# Patient Record
Sex: FEMALE | Race: WHITE | NOT HISPANIC OR LATINO
[De-identification: names, ages, dates, MRNs, and addresses within clinical notes are randomized per-mention and may not be internally consistent; named-entity substitution may affect disease eponyms.]

---

## 2022-02-21 ENCOUNTER — TRANSCRIPTION ENCOUNTER (OUTPATIENT)
Age: 67
End: 2022-02-21

## 2022-12-19 ENCOUNTER — OFFICE (OUTPATIENT)
Dept: URBAN - METROPOLITAN AREA CLINIC 113 | Facility: CLINIC | Age: 67
Setting detail: OPHTHALMOLOGY
End: 2022-12-19
Payer: COMMERCIAL

## 2022-12-19 VITALS — HEIGHT: 55 IN

## 2022-12-19 DIAGNOSIS — H43.813: ICD-10-CM

## 2022-12-19 DIAGNOSIS — H26.493: ICD-10-CM

## 2022-12-19 DIAGNOSIS — E11.9: ICD-10-CM

## 2022-12-19 DIAGNOSIS — Z96.1: ICD-10-CM

## 2022-12-19 DIAGNOSIS — H35.373: ICD-10-CM

## 2022-12-19 PROCEDURE — 92014 COMPRE OPH EXAM EST PT 1/>: CPT | Performed by: OPTOMETRIST

## 2022-12-19 PROCEDURE — 92134 CPTRZ OPH DX IMG PST SGM RTA: CPT | Performed by: OPTOMETRIST

## 2022-12-19 ASSESSMENT — KERATOMETRY
OS_AXISANGLE_DEGREES: 078
OS_K1POWER_DIOPTERS: 46.25
OD_AXISANGLE_DEGREES: 091
OD_K2POWER_DIOPTERS: 48.25
METHOD_AUTO_MANUAL: AUTO
OD_K1POWER_DIOPTERS: 46.25
OS_K2POWER_DIOPTERS: 49.25

## 2022-12-19 ASSESSMENT — REFRACTION_MANIFEST
OD_SPHERE: -0.25
OD_AXIS: 025
OS_SPHERE: PLANO
OS_VA1: 20/20
OD_VA1: 20/20
OS_AXIS: 120
OD_CYLINDER: -1.00
OS_CYLINDER: -0.50

## 2022-12-19 ASSESSMENT — SPHEQUIV_DERIVED
OD_SPHEQUIV: -0.75
OS_SPHEQUIV: -1
OD_SPHEQUIV: -0.875

## 2022-12-19 ASSESSMENT — REFRACTION_AUTOREFRACTION
OD_AXIS: 034
OS_CYLINDER: -0.50
OD_SPHERE: -0.50
OS_AXIS: 098
OD_CYLINDER: -0.75
OS_SPHERE: -0.75

## 2022-12-19 ASSESSMENT — AXIALLENGTH_DERIVED
OD_AL: 22.5544
OD_AL: 22.599
OS_AL: 22.4759

## 2022-12-19 ASSESSMENT — TONOMETRY
OD_IOP_MMHG: 13
OS_IOP_MMHG: 15

## 2022-12-19 ASSESSMENT — VISUAL ACUITY
OD_BCVA: 20/20-
OS_BCVA: 20/20-

## 2022-12-19 ASSESSMENT — CONFRONTATIONAL VISUAL FIELD TEST (CVF)
OD_FINDINGS: FULL
OS_FINDINGS: FULL

## 2022-12-19 ASSESSMENT — LID POSITION - PTOSIS: OS_PTOSIS: LUL 1+

## 2023-03-20 ENCOUNTER — NON-APPOINTMENT (OUTPATIENT)
Age: 68
End: 2023-03-20

## 2023-03-20 ENCOUNTER — APPOINTMENT (OUTPATIENT)
Dept: GASTROENTEROLOGY | Facility: CLINIC | Age: 68
End: 2023-03-20
Payer: MEDICARE

## 2023-03-20 VITALS
OXYGEN SATURATION: 98 % | WEIGHT: 131 LBS | HEART RATE: 70 BPM | RESPIRATION RATE: 16 BRPM | DIASTOLIC BLOOD PRESSURE: 65 MMHG | BODY MASS INDEX: 24.73 KG/M2 | HEIGHT: 61 IN | SYSTOLIC BLOOD PRESSURE: 110 MMHG

## 2023-03-20 DIAGNOSIS — Z78.9 OTHER SPECIFIED HEALTH STATUS: ICD-10-CM

## 2023-03-20 PROCEDURE — 99203 OFFICE O/P NEW LOW 30 MIN: CPT

## 2023-03-20 RX ORDER — ROSUVASTATIN CALCIUM 20 MG/1
20 TABLET, FILM COATED ORAL
Refills: 0 | Status: ACTIVE | COMMUNITY

## 2023-03-20 RX ORDER — PENTOXIFYLLINE 400 MG/1
TABLET, EXTENDED RELEASE ORAL
Refills: 0 | Status: ACTIVE | COMMUNITY

## 2023-03-20 NOTE — PHYSICAL EXAM

## 2023-03-20 NOTE — ASSESSMENT
[FreeTextEntry1] : Patient is a 67 year female, with PMH HLD, hypothyroidism, NonHodgkins Lymphoma in 1984 s/p radiation, lung cancer in 2015 s/p surgical resection, breast cancer in 2020 s/p lumpectomy, who presents for colon cancer screening. \par \par Colonoscopy to be scheduled. I have discussed the indications, risks and benefits of procedure with patient. Risks include, but not limited to, bleeding, perforation, infection, and reaction to anesthesia. Alternatives to colonoscopy discussed with patient. Patient was given the opportunity to ask questions, all questions were answered. The patient agrees to proceed with colonoscopy. Patient is medically optimized for colonoscopy. \par \par Miralax/dulcolax prep to be used. Bowel prep instructions discussed at length. \par \par CBC/CMP, PT/INR ordered prior to procedure\par \par I evaluated this patient with my ACP Miladys and agree with the above assessment and management plan for repeat low risk screening colonoscopy in a patient who is status post negative colonoscopy roughly 10 years ago.  Her ASA classification is 2 and she is medically optimized for the proposed colonoscopy and appeared to understand all of the above instructions, information, and management plan.

## 2023-03-20 NOTE — ADDENDUM
[FreeTextEntry1] : I, Miladys Hunter PA-C, acted as a scribe for the services dictated to me by CYNTHIA Miles in this document on Mar 20, 2023 for FLORY JENKINS .\par

## 2023-05-31 DIAGNOSIS — L59.8 OTHER SPECIFIED DISORDERS OF THE SKIN AND SUBCUTANEOUS TISSUE RELATED TO RADIATION: ICD-10-CM
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
## 2023-06-04 ENCOUNTER — TRANSCRIPTION ENCOUNTER (OUTPATIENT)
Age: 68
End: 2023-06-04

## 2023-06-05 ENCOUNTER — APPOINTMENT (OUTPATIENT)
Dept: GASTROENTEROLOGY | Facility: GI CENTER | Age: 68
End: 2023-06-05
Payer: MEDICARE

## 2023-06-05 ENCOUNTER — OUTPATIENT (OUTPATIENT)
Dept: OUTPATIENT SERVICES | Facility: HOSPITAL | Age: 68
LOS: 1 days | End: 2023-06-05
Payer: MEDICARE

## 2023-06-05 ENCOUNTER — TRANSCRIPTION ENCOUNTER (OUTPATIENT)
Age: 68
End: 2023-06-05

## 2023-06-05 DIAGNOSIS — Z12.11 ENCOUNTER FOR SCREENING FOR MALIGNANT NEOPLASM OF COLON: ICD-10-CM

## 2023-06-05 DIAGNOSIS — K64.8 OTHER HEMORRHOIDS: ICD-10-CM

## 2023-06-05 PROCEDURE — G0121: CPT

## 2023-06-05 PROCEDURE — 45378 DIAGNOSTIC COLONOSCOPY: CPT | Mod: PT

## 2023-06-05 NOTE — REASON FOR VISIT
[Procedure: _________] : a [unfilled] procedure visit [FreeTextEntry1] : Pt. presents for screening colonoscopy.

## 2023-06-05 NOTE — PHYSICAL EXAM
[Alert] : alert [Normal Voice/Communication] : normal voice/communication [Healthy Appearing] : healthy appearing [No Acute Distress] : no acute distress [Well Developed] : well developed [Well Nourished] : well nourished [Sclera] : the sclera and conjunctiva were normal [Hearing Threshold Finger Rub Not [place]] : hearing was normal [Normal Lips/Gums] : the lips and gums were normal [Oropharynx] : the oropharynx was normal [Normal Appearance] : the appearance of the neck was normal [No Neck Mass] : no neck mass was observed [No Respiratory Distress] : no respiratory distress [No Acc Muscle Use] : no accessory muscle use [Respiration, Rhythm And Depth] : normal respiratory rhythm and effort [Auscultation Breath Sounds / Voice Sounds] : lungs were clear to auscultation bilaterally [Heart Rate And Rhythm] : heart rate was normal and rhythm regular [Normal S1, S2] : normal S1 and S2 [Murmurs] : no murmurs [None] : no edema [Bowel Sounds] : normal bowel sounds [Abdomen Tenderness] : non-tender [No Masses] : no abdominal mass palpated [Abdomen Soft] : soft [] : no hepatosplenomegaly [Oriented To Time, Place, And Person] : oriented to person, place, and time

## 2023-09-21 ENCOUNTER — NON-APPOINTMENT (OUTPATIENT)
Age: 68
End: 2023-09-21

## 2023-09-24 ENCOUNTER — NON-APPOINTMENT (OUTPATIENT)
Age: 68
End: 2023-09-24

## 2023-09-30 ENCOUNTER — EMERGENCY (EMERGENCY)
Facility: HOSPITAL | Age: 68
LOS: 1 days | Discharge: HOME | End: 2023-09-30
Attending: EMERGENCY MEDICINE
Payer: MEDICARE

## 2023-09-30 VITALS
SYSTOLIC BLOOD PRESSURE: 107 MMHG | HEART RATE: 89 BPM | TEMPERATURE: 98 F | OXYGEN SATURATION: 97 % | DIASTOLIC BLOOD PRESSURE: 69 MMHG | RESPIRATION RATE: 20 BRPM | WEIGHT: 136.69 LBS

## 2023-09-30 VITALS
HEART RATE: 91 BPM | SYSTOLIC BLOOD PRESSURE: 134 MMHG | OXYGEN SATURATION: 95 % | DIASTOLIC BLOOD PRESSURE: 80 MMHG | TEMPERATURE: 98 F | RESPIRATION RATE: 17 BRPM

## 2023-09-30 LAB
ALBUMIN SERPL ELPH-MCNC: 3.1 G/DL — LOW (ref 3.3–5.2)
ALP SERPL-CCNC: 859 U/L — HIGH (ref 40–120)
ALT FLD-CCNC: 528 U/L — HIGH
ANION GAP SERPL CALC-SCNC: 12 MMOL/L — SIGNIFICANT CHANGE UP (ref 5–17)
APTT BLD: 34.8 SEC — SIGNIFICANT CHANGE UP (ref 24.5–35.6)
AST SERPL-CCNC: 310 U/L — HIGH
BILIRUB SERPL-MCNC: 0.4 MG/DL — SIGNIFICANT CHANGE UP (ref 0.4–2)
BUN SERPL-MCNC: 22.2 MG/DL — HIGH (ref 8–20)
CALCIUM SERPL-MCNC: 7.9 MG/DL — LOW (ref 8.4–10.5)
CHLORIDE SERPL-SCNC: 94 MMOL/L — LOW (ref 96–108)
CO2 SERPL-SCNC: 23 MMOL/L — SIGNIFICANT CHANGE UP (ref 22–29)
CREAT SERPL-MCNC: 0.82 MG/DL — SIGNIFICANT CHANGE UP (ref 0.5–1.3)
EGFR: 77 ML/MIN/1.73M2 — SIGNIFICANT CHANGE UP
GLUCOSE SERPL-MCNC: 108 MG/DL — HIGH (ref 70–99)
HCT VFR BLD CALC: 33.2 % — LOW (ref 34.5–45)
HGB BLD-MCNC: 11.3 G/DL — LOW (ref 11.5–15.5)
INR BLD: 1.11 RATIO — SIGNIFICANT CHANGE UP (ref 0.85–1.18)
LIDOCAIN IGE QN: 22 U/L — SIGNIFICANT CHANGE UP (ref 22–51)
MAGNESIUM SERPL-MCNC: 1.8 MG/DL — SIGNIFICANT CHANGE UP (ref 1.6–2.6)
MCHC RBC-ENTMCNC: 28.8 PG — SIGNIFICANT CHANGE UP (ref 27–34)
MCHC RBC-ENTMCNC: 34 GM/DL — SIGNIFICANT CHANGE UP (ref 32–36)
MCV RBC AUTO: 84.7 FL — SIGNIFICANT CHANGE UP (ref 80–100)
NT-PROBNP SERPL-SCNC: 1176 PG/ML — HIGH (ref 0–300)
PLATELET # BLD AUTO: 151 K/UL — SIGNIFICANT CHANGE UP (ref 150–400)
POTASSIUM SERPL-MCNC: 4.4 MMOL/L — SIGNIFICANT CHANGE UP (ref 3.5–5.3)
POTASSIUM SERPL-SCNC: 4.4 MMOL/L — SIGNIFICANT CHANGE UP (ref 3.5–5.3)
PROT SERPL-MCNC: 8.1 G/DL — SIGNIFICANT CHANGE UP (ref 6.6–8.7)
PROTHROM AB SERPL-ACNC: 12.3 SEC — SIGNIFICANT CHANGE UP (ref 9.5–13)
RBC # BLD: 3.92 M/UL — SIGNIFICANT CHANGE UP (ref 3.8–5.2)
RBC # FLD: 14.6 % — HIGH (ref 10.3–14.5)
SODIUM SERPL-SCNC: 129 MMOL/L — LOW (ref 135–145)
TROPONIN T SERPL-MCNC: <0.01 NG/ML — SIGNIFICANT CHANGE UP (ref 0–0.06)
WBC # BLD: 8.69 K/UL — SIGNIFICANT CHANGE UP (ref 3.8–10.5)
WBC # FLD AUTO: 8.69 K/UL — SIGNIFICANT CHANGE UP (ref 3.8–10.5)

## 2023-09-30 PROCEDURE — 83690 ASSAY OF LIPASE: CPT

## 2023-09-30 PROCEDURE — G1004: CPT

## 2023-09-30 PROCEDURE — 85730 THROMBOPLASTIN TIME PARTIAL: CPT

## 2023-09-30 PROCEDURE — 80053 COMPREHEN METABOLIC PANEL: CPT

## 2023-09-30 PROCEDURE — 71275 CT ANGIOGRAPHY CHEST: CPT | Mod: MA

## 2023-09-30 PROCEDURE — 80074 ACUTE HEPATITIS PANEL: CPT

## 2023-09-30 PROCEDURE — 83735 ASSAY OF MAGNESIUM: CPT

## 2023-09-30 PROCEDURE — 83880 ASSAY OF NATRIURETIC PEPTIDE: CPT

## 2023-09-30 PROCEDURE — 76705 ECHO EXAM OF ABDOMEN: CPT

## 2023-09-30 PROCEDURE — 80307 DRUG TEST PRSMV CHEM ANLYZR: CPT

## 2023-09-30 PROCEDURE — 74176 CT ABD & PELVIS W/O CONTRAST: CPT | Mod: ME

## 2023-09-30 PROCEDURE — 74176 CT ABD & PELVIS W/O CONTRAST: CPT | Mod: 26,ME

## 2023-09-30 PROCEDURE — 85025 COMPLETE CBC W/AUTO DIFF WBC: CPT

## 2023-09-30 PROCEDURE — 85610 PROTHROMBIN TIME: CPT

## 2023-09-30 PROCEDURE — 93010 ELECTROCARDIOGRAM REPORT: CPT

## 2023-09-30 PROCEDURE — 71275 CT ANGIOGRAPHY CHEST: CPT | Mod: 26,MA

## 2023-09-30 PROCEDURE — 71046 X-RAY EXAM CHEST 2 VIEWS: CPT

## 2023-09-30 PROCEDURE — 84484 ASSAY OF TROPONIN QUANT: CPT

## 2023-09-30 PROCEDURE — 93005 ELECTROCARDIOGRAM TRACING: CPT

## 2023-09-30 PROCEDURE — 76705 ECHO EXAM OF ABDOMEN: CPT | Mod: 26

## 2023-09-30 PROCEDURE — 99285 EMERGENCY DEPT VISIT HI MDM: CPT

## 2023-09-30 PROCEDURE — 36415 COLL VENOUS BLD VENIPUNCTURE: CPT

## 2023-09-30 PROCEDURE — 71046 X-RAY EXAM CHEST 2 VIEWS: CPT | Mod: 26

## 2023-09-30 PROCEDURE — 99285 EMERGENCY DEPT VISIT HI MDM: CPT | Mod: 25

## 2023-09-30 RX ORDER — ACETAMINOPHEN WITH CODEINE 300MG-30MG
1 TABLET ORAL ONCE
Refills: 0 | Status: DISCONTINUED | OUTPATIENT
Start: 2023-09-30 | End: 2023-09-30

## 2023-09-30 RX ADMIN — Medication 1 TABLET(S): at 18:58

## 2023-09-30 NOTE — ED PROVIDER NOTE - OBJECTIVE STATEMENT
68yo female with pmh of non hodgkins lymphoma, hypothyroidism, breast cancer presents with sob, chest pain and back pain. Pt states she started to have lower back pain a few days ago and then noticed a rash. Pt with 10 days of cough and the sob, oneil and intermittent chest pain. Pt  unsure of alleviating or exacerbating factors to chest pain. PT with chills and subjective fevers. Pt denies ha, loc, focal neuro deficits, palp, abd pain/n/v/d, urinary symptoms, recent travel

## 2023-09-30 NOTE — ED PROVIDER NOTE - PATIENT PORTAL LINK FT
You can access the FollowMyHealth Patient Portal offered by [facility] by registering at the following website: [email or web address]. By joining [facility]’s FollowMyHealth portal, you will also be able to view your health information using other applications (apps) compatible with our system.

## 2023-09-30 NOTE — ED PROVIDER NOTE - PHYSICAL EXAMINATION
Const: Awake, alert and oriented. In no acute distress. Well appearing.  HEENT: NC/AT. Moist mucous membranes.  Eyes: No scleral icterus. EOMI.  Neck:. Soft and supple. Full ROM without pain.  Cardiac: Regular rate and regular rhythm. +S1/S2. Peripheral pulses 2+ and symmetric. No LE edema.  Resp: Speaking in full sentences. No evidence of respiratory distress. No wheezes, rales or rhonchi.  Abd: Soft, non-tender, non-distended. Normal bowel sounds in all 4 quadrants. No guarding or rebound.  Back: Spine midline and non-tender. No CVAT.  Skin: right lower back with blanching erythematous rash starting to have a vesicle dermatomal like distribution   Lymph: No cervical lymphadenopathy.  Neuro: Awake, alert & oriented x 3. Moves all extremities symmetrically.

## 2023-09-30 NOTE — ED ADULT NURSE REASSESSMENT NOTE - NS ED NURSE REASSESS COMMENT FT1
Pt had bloodwork drawn for testing, pt udpated on the plan of care by myself and MD Hernandez, pt states understanding of update to plan of care, pt has no requests at this time, pt is resting comfortably reading a book. VS noted in the chart. Education proficiency determined successful by teach back.

## 2023-09-30 NOTE — ED ADULT NURSE REASSESSMENT NOTE - NS ED NURSE REASSESS COMMENT FT1
Assuming care from TERRY Herrera, review of patients history, reason for ED visit, medication administered, tests performed/to be performed, introduced to pt at bedside, updated patient as to the plan of care, pt education deemed successful after teach back shows proficiency. Pt to be taken to CT scan at this time.

## 2023-09-30 NOTE — ED PROVIDER NOTE - CLINICAL SUMMARY MEDICAL DECISION MAKING FREE TEXT BOX
68yo female with pmh of non hodgkins lymphoma, hypothyroidism, breast cancer presents with sob, chest pain and back pain. Found to have zoster like rash, pt with BP similar, pulses equal bilaterally, well appearing due to history high risk PE will do labs, CTA, pt with no acute ischemic changes on ekg, will do trop

## 2023-09-30 NOTE — ED ADULT NURSE NOTE - OBJECTIVE STATEMENT
A&OX4, Presents to ed with complaints of lower back pain, cough and intermittent chest pain, Patient reports having rsv ten days ago. A&OX4, Presents to ed with complaints of lower back pain, cough and intermittent chest pain, Patient reports having rsv ten days ago. Reports hx of hypothyroidism, non hodgkins lymphoma.

## 2023-09-30 NOTE — ED PROVIDER NOTE - NSFOLLOWUPINSTRUCTIONS_ED_ALL_ED_FT
You are advised to please follow up with your primary care doctor within the next 24 hours and return to the Emergency Department for worsening symptoms or any other concerns.  Your doctor may call [phone] to follow up on the specific results of the tests performed today in the emergency department.    YOUR CT SHOWS DIFFUSE LYMPHADENOPATHY CONCERNING FOR RECURRENCE OF LYMPHOMA OR METASTATIC DISEASE. YOU MUST FOLLOW UP WITH YOUR ONCOLOGIST AS SOON AS POSSIBLE. YOUR LIVER ENZYMES ARE ALSO ELEVATED. PLEASE FOLLOW UP WITH YOUR DOCTOR.    Lymphadenopathy    Lymphadenopathy means that your lymph glands are swollen or larger than normal. Lymph glands, also called lymph nodes, are collections of tissue that filter excess fluid, bacteria, viruses, and waste from your bloodstream. They are part of your body's disease-fighting system (immune system), which protects your body from germs.    There may be different causes of lymphadenopathy, depending on where it is in your body. Some types go away on their own. Lymphadenopathy can occur anywhere that you have lymph glands, including these areas:  Neck (cervical lymphadenopathy).  Chest (mediastinal lymphadenopathy).  Lungs (hilar lymphadenopathy).  Underarms (axillary lymphadenopathy).  Groin (inguinal lymphadenopathy).  When your immune system responds to germs, infection-fighting cells and fluid build up in your lymph glands. This causes some swelling and enlargement. If the lymph nodes do not go back to normal size after you have an infection or disease, your health care provider may do tests. These tests help to monitor your condition and find the reason why the glands are still swollen and enlarged.    Follow these instructions at home:    Get plenty of rest.  Your health care provider may recommend over-the-counter medicines for pain. Take over-the-counter and prescription medicines only as told by your health care provider.  If directed, apply heat to swollen lymph glands as often as told by your health care provider. Use the heat source that your health care provider recommends, such as a moist heat pack or a heating pad.  Place a towel between your skin and the heat source.  Leave the heat on for 20–30 minutes.  Remove the heat if your skin turns bright red. This is especially important if you are unable to feel pain, heat, or cold. You may have a greater risk of getting burned.  Check your affected lymph glands every day for changes. Check other lymph gland areas as told by your health care provider. Check for changes such as:  More swelling.  Sudden increase in size.  Redness or pain.  Hardness.  Keep all follow-up visits. This is important.  Contact a health care provider if you have:  Lymph glands that:  Are still swollen after 2 weeks.  Have suddenly gotten bigger or the swelling spreads.  Are red, painful, or hard.  Fluid leaking from the skin near an enlarged lymph gland.  Problems with breathing.  A fever, chills, or night sweats.  Fatigue.  A sore throat.  Pain in your abdomen.  Weight loss.  Get help right away if you have:  Severe pain.  Chest pain.  Shortness of breath.  These symptoms may represent a serious problem that is an emergency. Do not wait to see if the symptoms will go away. Get medical help right away. Call your local emergency services (911 in the [place] Do not drive yourself to the hospital.    Summary  Lymphadenopathy means that your lymph glands are swollen or larger than normal.  Lymph glands, also called lymph nodes, are collections of tissue that filter excess fluid, bacteria, viruses, and waste from the bloodstream. They are part of your body's disease-fighting system (immune system).  Lymphadenopathy can occur anywhere that you have lymph glands.  If the lymph nodes do not go back to normal size after you have an infection or disease, your health care provider may do tests to monitor your condition and find the reason why the glands are still swollen and enlarged.  Check your affected lymph glands every day for changes. Check other lymph gland areas as told by your health care provider.

## 2023-10-01 LAB
HAV IGM SER-ACNC: SIGNIFICANT CHANGE UP
HBV CORE IGM SER-ACNC: SIGNIFICANT CHANGE UP
HBV SURFACE AG SER-ACNC: SIGNIFICANT CHANGE UP
HCV AB S/CO SERPL IA: 0.29 S/CO — SIGNIFICANT CHANGE UP (ref 0–0.99)
HCV AB SERPL-IMP: SIGNIFICANT CHANGE UP

## 2023-10-01 RX ORDER — OXYCODONE AND ACETAMINOPHEN 5; 325 MG/1; MG/1
1 TABLET ORAL
Qty: 9 | Refills: 0
Start: 2023-10-01 | End: 2023-10-03

## 2023-10-01 NOTE — CHART NOTE - NSCHARTNOTEFT_GEN_A_CORE
Pt called back, Dr. Rodriguez forgot to send oxycodone to pts pharmacy. Pt was treated with pain meds in the ED. iStop checked, no recent rx. Will send 3 day supply.

## 2023-10-02 LAB
ANISOCYTOSIS BLD QL: SLIGHT — SIGNIFICANT CHANGE UP
BASOPHILS # BLD AUTO: 0 K/UL — SIGNIFICANT CHANGE UP (ref 0–0.2)
BASOPHILS NFR BLD AUTO: 0 % — SIGNIFICANT CHANGE UP (ref 0–2)
BLASTS # FLD: 0.9 % — HIGH (ref 0–0)
DACRYOCYTES BLD QL SMEAR: SLIGHT — SIGNIFICANT CHANGE UP
EOSINOPHIL # BLD AUTO: 0 K/UL — SIGNIFICANT CHANGE UP (ref 0–0.5)
EOSINOPHIL NFR BLD AUTO: 0 % — SIGNIFICANT CHANGE UP (ref 0–6)
GIANT PLATELETS BLD QL SMEAR: PRESENT — SIGNIFICANT CHANGE UP
LYMPHOCYTES # BLD AUTO: 3.03 K/UL — SIGNIFICANT CHANGE UP (ref 1–3.3)
LYMPHOCYTES # BLD AUTO: 34.9 % — SIGNIFICANT CHANGE UP (ref 13–44)
MANUAL SMEAR VERIFICATION: SIGNIFICANT CHANGE UP
MONOCYTES # BLD AUTO: 0.98 K/UL — HIGH (ref 0–0.9)
MONOCYTES NFR BLD AUTO: 11.3 % — SIGNIFICANT CHANGE UP (ref 2–14)
MYELOCYTES NFR BLD: 0.6 % — HIGH (ref 0–0)
NEUTROPHILS # BLD AUTO: 3.62 K/UL — SIGNIFICANT CHANGE UP (ref 1.8–7.4)
NEUTROPHILS NFR BLD AUTO: 41.6 % — LOW (ref 43–77)
NRBC # BLD: 0 /100 — SIGNIFICANT CHANGE UP (ref 0–0)
OVALOCYTES BLD QL SMEAR: SLIGHT — SIGNIFICANT CHANGE UP
PLAT MORPH BLD: NORMAL — SIGNIFICANT CHANGE UP
POIKILOCYTOSIS BLD QL AUTO: SLIGHT — SIGNIFICANT CHANGE UP
POLYCHROMASIA BLD QL SMEAR: SIGNIFICANT CHANGE UP
RBC BLD AUTO: ABNORMAL
SMUDGE CELLS # BLD: PRESENT — SIGNIFICANT CHANGE UP
VARIANT LYMPHS # BLD: 10.7 % — HIGH (ref 0–6)

## 2023-11-03 ENCOUNTER — INPATIENT (INPATIENT)
Facility: HOSPITAL | Age: 68
LOS: 3 days | Discharge: HOME | DRG: 193 | End: 2023-11-07
Attending: HOSPITALIST | Admitting: HOSPITALIST
Payer: MEDICARE

## 2023-11-03 VITALS
HEIGHT: 61 IN | TEMPERATURE: 98 F | HEART RATE: 105 BPM | WEIGHT: 126.1 LBS | DIASTOLIC BLOOD PRESSURE: 82 MMHG | RESPIRATION RATE: 18 BRPM | SYSTOLIC BLOOD PRESSURE: 158 MMHG | OXYGEN SATURATION: 94 %

## 2023-11-03 DIAGNOSIS — J18.9 PNEUMONIA, UNSPECIFIED ORGANISM: ICD-10-CM

## 2023-11-03 LAB
ALBUMIN SERPL ELPH-MCNC: 3.3 G/DL — SIGNIFICANT CHANGE UP (ref 3.3–5.2)
ALBUMIN SERPL ELPH-MCNC: 3.3 G/DL — SIGNIFICANT CHANGE UP (ref 3.3–5.2)
ALP SERPL-CCNC: 536 U/L — HIGH (ref 40–120)
ALP SERPL-CCNC: 536 U/L — HIGH (ref 40–120)
ALT FLD-CCNC: 111 U/L — HIGH
ALT FLD-CCNC: 111 U/L — HIGH
ANION GAP SERPL CALC-SCNC: 11 MMOL/L — SIGNIFICANT CHANGE UP (ref 5–17)
ANION GAP SERPL CALC-SCNC: 11 MMOL/L — SIGNIFICANT CHANGE UP (ref 5–17)
ANISOCYTOSIS BLD QL: SLIGHT — SIGNIFICANT CHANGE UP
ANISOCYTOSIS BLD QL: SLIGHT — SIGNIFICANT CHANGE UP
APTT BLD: 32.5 SEC — SIGNIFICANT CHANGE UP (ref 24.5–35.6)
APTT BLD: 32.5 SEC — SIGNIFICANT CHANGE UP (ref 24.5–35.6)
AST SERPL-CCNC: 110 U/L — HIGH
AST SERPL-CCNC: 110 U/L — HIGH
BASOPHILS # BLD AUTO: 0 K/UL — SIGNIFICANT CHANGE UP (ref 0–0.2)
BASOPHILS # BLD AUTO: 0 K/UL — SIGNIFICANT CHANGE UP (ref 0–0.2)
BASOPHILS NFR BLD AUTO: 0 % — SIGNIFICANT CHANGE UP (ref 0–2)
BASOPHILS NFR BLD AUTO: 0 % — SIGNIFICANT CHANGE UP (ref 0–2)
BILIRUB SERPL-MCNC: 0.5 MG/DL — SIGNIFICANT CHANGE UP (ref 0.4–2)
BILIRUB SERPL-MCNC: 0.5 MG/DL — SIGNIFICANT CHANGE UP (ref 0.4–2)
BUN SERPL-MCNC: 13.2 MG/DL — SIGNIFICANT CHANGE UP (ref 8–20)
BUN SERPL-MCNC: 13.2 MG/DL — SIGNIFICANT CHANGE UP (ref 8–20)
CALCIUM SERPL-MCNC: 8.6 MG/DL — SIGNIFICANT CHANGE UP (ref 8.4–10.5)
CALCIUM SERPL-MCNC: 8.6 MG/DL — SIGNIFICANT CHANGE UP (ref 8.4–10.5)
CHLORIDE SERPL-SCNC: 99 MMOL/L — SIGNIFICANT CHANGE UP (ref 96–108)
CHLORIDE SERPL-SCNC: 99 MMOL/L — SIGNIFICANT CHANGE UP (ref 96–108)
CO2 SERPL-SCNC: 24 MMOL/L — SIGNIFICANT CHANGE UP (ref 22–29)
CO2 SERPL-SCNC: 24 MMOL/L — SIGNIFICANT CHANGE UP (ref 22–29)
CREAT SERPL-MCNC: 0.58 MG/DL — SIGNIFICANT CHANGE UP (ref 0.5–1.3)
CREAT SERPL-MCNC: 0.58 MG/DL — SIGNIFICANT CHANGE UP (ref 0.5–1.3)
EGFR: 97 ML/MIN/1.73M2 — SIGNIFICANT CHANGE UP
EGFR: 97 ML/MIN/1.73M2 — SIGNIFICANT CHANGE UP
EOSINOPHIL # BLD AUTO: 0 K/UL — SIGNIFICANT CHANGE UP (ref 0–0.5)
EOSINOPHIL # BLD AUTO: 0 K/UL — SIGNIFICANT CHANGE UP (ref 0–0.5)
EOSINOPHIL NFR BLD AUTO: 0 % — SIGNIFICANT CHANGE UP (ref 0–6)
EOSINOPHIL NFR BLD AUTO: 0 % — SIGNIFICANT CHANGE UP (ref 0–6)
FLUAV AG NPH QL: SIGNIFICANT CHANGE UP
FLUAV AG NPH QL: SIGNIFICANT CHANGE UP
FLUBV AG NPH QL: SIGNIFICANT CHANGE UP
FLUBV AG NPH QL: SIGNIFICANT CHANGE UP
GLUCOSE SERPL-MCNC: 120 MG/DL — HIGH (ref 70–99)
GLUCOSE SERPL-MCNC: 120 MG/DL — HIGH (ref 70–99)
HCT VFR BLD CALC: 33.3 % — LOW (ref 34.5–45)
HCT VFR BLD CALC: 33.3 % — LOW (ref 34.5–45)
HGB BLD-MCNC: 10.9 G/DL — LOW (ref 11.5–15.5)
HGB BLD-MCNC: 10.9 G/DL — LOW (ref 11.5–15.5)
INR BLD: 0.99 RATIO — SIGNIFICANT CHANGE UP (ref 0.85–1.18)
INR BLD: 0.99 RATIO — SIGNIFICANT CHANGE UP (ref 0.85–1.18)
LYMPHOCYTES # BLD AUTO: 10.89 K/UL — HIGH (ref 1–3.3)
LYMPHOCYTES # BLD AUTO: 10.89 K/UL — HIGH (ref 1–3.3)
LYMPHOCYTES # BLD AUTO: 80.2 % — HIGH (ref 13–44)
LYMPHOCYTES # BLD AUTO: 80.2 % — HIGH (ref 13–44)
MANUAL SMEAR VERIFICATION: SIGNIFICANT CHANGE UP
MANUAL SMEAR VERIFICATION: SIGNIFICANT CHANGE UP
MCHC RBC-ENTMCNC: 28.8 PG — SIGNIFICANT CHANGE UP (ref 27–34)
MCHC RBC-ENTMCNC: 28.8 PG — SIGNIFICANT CHANGE UP (ref 27–34)
MCHC RBC-ENTMCNC: 32.7 GM/DL — SIGNIFICANT CHANGE UP (ref 32–36)
MCHC RBC-ENTMCNC: 32.7 GM/DL — SIGNIFICANT CHANGE UP (ref 32–36)
MCV RBC AUTO: 88.1 FL — SIGNIFICANT CHANGE UP (ref 80–100)
MCV RBC AUTO: 88.1 FL — SIGNIFICANT CHANGE UP (ref 80–100)
MONOCYTES # BLD AUTO: 1.1 K/UL — HIGH (ref 0–0.9)
MONOCYTES # BLD AUTO: 1.1 K/UL — HIGH (ref 0–0.9)
MONOCYTES NFR BLD AUTO: 8.1 % — SIGNIFICANT CHANGE UP (ref 2–14)
MONOCYTES NFR BLD AUTO: 8.1 % — SIGNIFICANT CHANGE UP (ref 2–14)
NEUTROPHILS # BLD AUTO: 1.59 K/UL — LOW (ref 1.8–7.4)
NEUTROPHILS # BLD AUTO: 1.59 K/UL — LOW (ref 1.8–7.4)
NEUTROPHILS NFR BLD AUTO: 11.7 % — LOW (ref 43–77)
NEUTROPHILS NFR BLD AUTO: 11.7 % — LOW (ref 43–77)
NT-PROBNP SERPL-SCNC: 376 PG/ML — HIGH (ref 0–300)
NT-PROBNP SERPL-SCNC: 376 PG/ML — HIGH (ref 0–300)
PLAT MORPH BLD: NORMAL — SIGNIFICANT CHANGE UP
PLAT MORPH BLD: NORMAL — SIGNIFICANT CHANGE UP
PLATELET # BLD AUTO: 155 K/UL — SIGNIFICANT CHANGE UP (ref 150–400)
PLATELET # BLD AUTO: 155 K/UL — SIGNIFICANT CHANGE UP (ref 150–400)
POLYCHROMASIA BLD QL SMEAR: SLIGHT — SIGNIFICANT CHANGE UP
POLYCHROMASIA BLD QL SMEAR: SLIGHT — SIGNIFICANT CHANGE UP
POTASSIUM SERPL-MCNC: 4.8 MMOL/L — SIGNIFICANT CHANGE UP (ref 3.5–5.3)
POTASSIUM SERPL-MCNC: 4.8 MMOL/L — SIGNIFICANT CHANGE UP (ref 3.5–5.3)
POTASSIUM SERPL-SCNC: 4.8 MMOL/L — SIGNIFICANT CHANGE UP (ref 3.5–5.3)
POTASSIUM SERPL-SCNC: 4.8 MMOL/L — SIGNIFICANT CHANGE UP (ref 3.5–5.3)
PROT SERPL-MCNC: 8.2 G/DL — SIGNIFICANT CHANGE UP (ref 6.6–8.7)
PROT SERPL-MCNC: 8.2 G/DL — SIGNIFICANT CHANGE UP (ref 6.6–8.7)
PROTHROM AB SERPL-ACNC: 11 SEC — SIGNIFICANT CHANGE UP (ref 9.5–13)
PROTHROM AB SERPL-ACNC: 11 SEC — SIGNIFICANT CHANGE UP (ref 9.5–13)
RBC # BLD: 3.78 M/UL — LOW (ref 3.8–5.2)
RBC # BLD: 3.78 M/UL — LOW (ref 3.8–5.2)
RBC # FLD: 19.3 % — HIGH (ref 10.3–14.5)
RBC # FLD: 19.3 % — HIGH (ref 10.3–14.5)
RBC BLD AUTO: ABNORMAL
RBC BLD AUTO: ABNORMAL
RSV RNA NPH QL NAA+NON-PROBE: SIGNIFICANT CHANGE UP
RSV RNA NPH QL NAA+NON-PROBE: SIGNIFICANT CHANGE UP
SARS-COV-2 RNA SPEC QL NAA+PROBE: SIGNIFICANT CHANGE UP
SARS-COV-2 RNA SPEC QL NAA+PROBE: SIGNIFICANT CHANGE UP
SODIUM SERPL-SCNC: 134 MMOL/L — LOW (ref 135–145)
SODIUM SERPL-SCNC: 134 MMOL/L — LOW (ref 135–145)
TROPONIN T SERPL-MCNC: <0.01 NG/ML — SIGNIFICANT CHANGE UP (ref 0–0.06)
TROPONIN T SERPL-MCNC: <0.01 NG/ML — SIGNIFICANT CHANGE UP (ref 0–0.06)
WBC # BLD: 13.58 K/UL — HIGH (ref 3.8–10.5)
WBC # BLD: 13.58 K/UL — HIGH (ref 3.8–10.5)
WBC # FLD AUTO: 13.58 K/UL — HIGH (ref 3.8–10.5)
WBC # FLD AUTO: 13.58 K/UL — HIGH (ref 3.8–10.5)

## 2023-11-03 PROCEDURE — 99285 EMERGENCY DEPT VISIT HI MDM: CPT

## 2023-11-03 PROCEDURE — 71275 CT ANGIOGRAPHY CHEST: CPT | Mod: 26,MA

## 2023-11-03 PROCEDURE — 93010 ELECTROCARDIOGRAM REPORT: CPT

## 2023-11-03 RX ORDER — AZITHROMYCIN 500 MG/1
500 TABLET, FILM COATED ORAL ONCE
Refills: 0 | Status: COMPLETED | OUTPATIENT
Start: 2023-11-03 | End: 2023-11-03

## 2023-11-03 RX ORDER — SODIUM CHLORIDE 9 MG/ML
1000 INJECTION INTRAMUSCULAR; INTRAVENOUS; SUBCUTANEOUS ONCE
Refills: 0 | Status: COMPLETED | OUTPATIENT
Start: 2023-11-03 | End: 2023-11-03

## 2023-11-03 RX ORDER — CEFTRIAXONE 500 MG/1
1000 INJECTION, POWDER, FOR SOLUTION INTRAMUSCULAR; INTRAVENOUS ONCE
Refills: 0 | Status: DISCONTINUED | OUTPATIENT
Start: 2023-11-03 | End: 2023-11-03

## 2023-11-03 RX ORDER — MORPHINE SULFATE 50 MG/1
4 CAPSULE, EXTENDED RELEASE ORAL ONCE
Refills: 0 | Status: DISCONTINUED | OUTPATIENT
Start: 2023-11-03 | End: 2023-11-03

## 2023-11-03 RX ORDER — CEFTRIAXONE 500 MG/1
1000 INJECTION, POWDER, FOR SOLUTION INTRAMUSCULAR; INTRAVENOUS ONCE
Refills: 0 | Status: COMPLETED | OUTPATIENT
Start: 2023-11-03 | End: 2023-11-03

## 2023-11-03 RX ADMIN — CEFTRIAXONE 1000 MILLIGRAM(S): 500 INJECTION, POWDER, FOR SOLUTION INTRAMUSCULAR; INTRAVENOUS at 23:00

## 2023-11-03 RX ADMIN — AZITHROMYCIN 255 MILLIGRAM(S): 500 TABLET, FILM COATED ORAL at 23:01

## 2023-11-03 RX ADMIN — SODIUM CHLORIDE 1000 MILLILITER(S): 9 INJECTION INTRAMUSCULAR; INTRAVENOUS; SUBCUTANEOUS at 16:51

## 2023-11-03 RX ADMIN — MORPHINE SULFATE 4 MILLIGRAM(S): 50 CAPSULE, EXTENDED RELEASE ORAL at 18:39

## 2023-11-03 NOTE — ED PROVIDER NOTE - PROGRESS NOTE DETAILS
Patient signed out to me by  After back.  Patient reassessed, remains persistently tachycardic, borderline low SPO2, 91 to 93% on room air.  Patient states that she is having a cough and shortness of breath.  CT angio was reviewed, no pulmonary embolism however there is a consolidation is concerning for pneumonia versus mass.  This was not present 1 month ago on x-ray.  Will treat for pneumonia, due to patient's persistent tachycardia and borderline low SPO2, plan to admit patient to medicine for further management.  Will give additional 1 L of normal saline. Patient signed out to me by Dr. Jeter.  Patient reassessed, remains persistently tachycardic, borderline low SPO2, 91 to 93% on room air.  Patient states that she is having a cough and shortness of breath.  CT angio was reviewed, no pulmonary embolism however there is a consolidation is concerning for pneumonia versus mass.  This was not present 1 month ago on x-ray.  Will treat for pneumonia, due to patient's persistent tachycardia and borderline low SPO2, plan to admit patient to medicine for further management.  Will give additional 1 L of normal saline.

## 2023-11-03 NOTE — ED PROVIDER NOTE - PHYSICAL EXAMINATION
Gen: Alert, NAD  Head: NC, AT, PERRL, EOMI, normal lids/conjunctiva  ENT: normal hearing, patent oropharynx   Neck: +supple, no tenderness/meningismus/JVD, +Trachea midline  Pulm: Bilateral BS, normal resp effort, no wheeze/stridor/retractions  CV: RRR, no R/G, +dist pulses  Abd: soft, NT/ND, +BS, no hepatosplenomegaly  Mskel: no edema/erythema/cyanosis  Skin: no rash  Neuro: AAOx3, no gross sensory/motor deficits

## 2023-11-03 NOTE — ED ADULT TRIAGE NOTE - CHIEF COMPLAINT QUOTE
pt c/o mid to left chest pain x a few days, spoke with cardiologist told to go to ED  A&OX3, resp wnl

## 2023-11-03 NOTE — ED PROVIDER NOTE - CLINICAL SUMMARY MEDICAL DECISION MAKING FREE TEXT BOX
Lizzette Kruse DO: Patient signed out to me by Dr. Jeter. Patient with h/o B cell lymphoma, NIDDM, HTN, nonhodgkins lymphoma presenting with shortness of breath, chest pain, cough. Patient persistently tachycardic, EKG sinus tachycardia no NEGIN or depressions, labs show leukocytosis, anemia, transaminitis. Given 1L NS. CTA ordered to r/o PE. Upon signout, Patient was reassessed, remains persistently tachycardic, borderline low SPO2, 91 to 93% on room air.  Patient states that she is having a cough and shortness of breath.  CT angio was reviewed, no pulmonary embolism however there is a consolidation is concerning for pneumonia versus mass.  This was not present 1 month ago on x-ray.  Will treat for pneumonia, due to patient's persistent tachycardia and borderline low SPO2, plan to admit patient to medicine for further management.  Will give additional 1 L of normal saline.

## 2023-11-03 NOTE — ED ADULT NURSE NOTE - ED STAT RN HANDOFF DETAILS
Report given to  RN in CDU. pt is 1&o x4 and aware of plan of care. pt is aware of plan of care. pt sent on tele box.

## 2023-11-03 NOTE — ED ADULT NURSE REASSESSMENT NOTE - NS ED NURSE REASSESS COMMENT FT1
Assumed care for pt at 1930. pt is a&o x4 and on cardiac monitor reading 97 bpm in sinus rhythm and 94% on room air. pt awaiting for cat scan. pt is aware of plan of care. bed is locked and in lowest position with breathing equal and unlabored.

## 2023-11-03 NOTE — ED ADULT NURSE NOTE - HISTORY OF COVID-19 VACCINATION
Called patient in regards to her missed appointment this morning with Dr. Giordano.   
Left message for patient to contact our office to inform her I will need to draw her labs prior to her appointment with Dr. Giordano. If she can come in earlier than her scheduled 0940 that would be great. If not I will draw her labs upon arrival.   
Vaccine status unknown

## 2023-11-03 NOTE — ED PROVIDER NOTE - OBJECTIVE STATEMENT
Pertinent PMH/PSH/FHx/SHx and Review of Systems contained within:  Patient presents to the ED for 3 weeks of dyspnea on exertion with tachycardia to 130s with mild exertion.  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 PMH of NIDDM2, HTN, hypothyroid, nonhodkins lymphoma with resection of lung and breast.  Takes Trental due to scarring in neck from treatment.  VSS.  Non toxic.  Well appearing. No aggravating or relieving factors.  No other pertinent PMH.   No fever/chills, No photophobia/eye pain/changes in vision, No ear pain/sore throat/dysphagia, No chest pain/palpitations, no cough/wheeze/stridor, No abdominal pain, No N/V/D, no dysuria/frequency/discharge, No neck/back pain, no rash, no changes in neurological status/function.

## 2023-11-03 NOTE — ED ADULT NURSE NOTE - OBJECTIVE STATEMENT
67 y/o female sent by cardiologist. presents to ED c/o left ribs and radiates to right lower back and oneil x 3 weeks. sinus tach noted on CM+. O2 sat WDL on room air. pt states they took oxycodone 2.5 mg @ home and offer relief. states they were recently required to wear O2 @ home for 1 week d/t EBV. denies fever, chills, sob @ rest, headache ,dizziness, n/v/d.

## 2023-11-04 DIAGNOSIS — Z98.890 OTHER SPECIFIED POSTPROCEDURAL STATES: Chronic | ICD-10-CM

## 2023-11-04 LAB
A1C WITH ESTIMATED AVERAGE GLUCOSE RESULT: 5.6 % — SIGNIFICANT CHANGE UP (ref 4–5.6)
A1C WITH ESTIMATED AVERAGE GLUCOSE RESULT: 5.6 % — SIGNIFICANT CHANGE UP (ref 4–5.6)
ANION GAP SERPL CALC-SCNC: 8 MMOL/L — SIGNIFICANT CHANGE UP (ref 5–17)
ANION GAP SERPL CALC-SCNC: 8 MMOL/L — SIGNIFICANT CHANGE UP (ref 5–17)
BUN SERPL-MCNC: 10 MG/DL — SIGNIFICANT CHANGE UP (ref 8–20)
BUN SERPL-MCNC: 10 MG/DL — SIGNIFICANT CHANGE UP (ref 8–20)
CALCIUM SERPL-MCNC: 7.9 MG/DL — LOW (ref 8.4–10.5)
CALCIUM SERPL-MCNC: 7.9 MG/DL — LOW (ref 8.4–10.5)
CHLORIDE SERPL-SCNC: 103 MMOL/L — SIGNIFICANT CHANGE UP (ref 96–108)
CHLORIDE SERPL-SCNC: 103 MMOL/L — SIGNIFICANT CHANGE UP (ref 96–108)
CO2 SERPL-SCNC: 25 MMOL/L — SIGNIFICANT CHANGE UP (ref 22–29)
CO2 SERPL-SCNC: 25 MMOL/L — SIGNIFICANT CHANGE UP (ref 22–29)
CREAT SERPL-MCNC: 0.42 MG/DL — LOW (ref 0.5–1.3)
CREAT SERPL-MCNC: 0.42 MG/DL — LOW (ref 0.5–1.3)
EGFR: 105 ML/MIN/1.73M2 — SIGNIFICANT CHANGE UP
EGFR: 105 ML/MIN/1.73M2 — SIGNIFICANT CHANGE UP
ESTIMATED AVERAGE GLUCOSE: 114 MG/DL — SIGNIFICANT CHANGE UP (ref 68–114)
ESTIMATED AVERAGE GLUCOSE: 114 MG/DL — SIGNIFICANT CHANGE UP (ref 68–114)
GLUCOSE BLDC GLUCOMTR-MCNC: 105 MG/DL — HIGH (ref 70–99)
GLUCOSE BLDC GLUCOMTR-MCNC: 105 MG/DL — HIGH (ref 70–99)
GLUCOSE BLDC GLUCOMTR-MCNC: 118 MG/DL — HIGH (ref 70–99)
GLUCOSE BLDC GLUCOMTR-MCNC: 118 MG/DL — HIGH (ref 70–99)
GLUCOSE BLDC GLUCOMTR-MCNC: 128 MG/DL — HIGH (ref 70–99)
GLUCOSE BLDC GLUCOMTR-MCNC: 128 MG/DL — HIGH (ref 70–99)
GLUCOSE BLDC GLUCOMTR-MCNC: 91 MG/DL — SIGNIFICANT CHANGE UP (ref 70–99)
GLUCOSE BLDC GLUCOMTR-MCNC: 91 MG/DL — SIGNIFICANT CHANGE UP (ref 70–99)
GLUCOSE SERPL-MCNC: 94 MG/DL — SIGNIFICANT CHANGE UP (ref 70–99)
GLUCOSE SERPL-MCNC: 94 MG/DL — SIGNIFICANT CHANGE UP (ref 70–99)
HCT VFR BLD CALC: 30.8 % — LOW (ref 34.5–45)
HCT VFR BLD CALC: 30.8 % — LOW (ref 34.5–45)
HGB BLD-MCNC: 9.9 G/DL — LOW (ref 11.5–15.5)
HGB BLD-MCNC: 9.9 G/DL — LOW (ref 11.5–15.5)
LEGIONELLA AG UR QL: NEGATIVE — SIGNIFICANT CHANGE UP
LEGIONELLA AG UR QL: NEGATIVE — SIGNIFICANT CHANGE UP
MCHC RBC-ENTMCNC: 28.6 PG — SIGNIFICANT CHANGE UP (ref 27–34)
MCHC RBC-ENTMCNC: 28.6 PG — SIGNIFICANT CHANGE UP (ref 27–34)
MCHC RBC-ENTMCNC: 32.1 GM/DL — SIGNIFICANT CHANGE UP (ref 32–36)
MCHC RBC-ENTMCNC: 32.1 GM/DL — SIGNIFICANT CHANGE UP (ref 32–36)
MCV RBC AUTO: 89 FL — SIGNIFICANT CHANGE UP (ref 80–100)
MCV RBC AUTO: 89 FL — SIGNIFICANT CHANGE UP (ref 80–100)
MRSA PCR RESULT.: SIGNIFICANT CHANGE UP
MRSA PCR RESULT.: SIGNIFICANT CHANGE UP
PLATELET # BLD AUTO: 156 K/UL — SIGNIFICANT CHANGE UP (ref 150–400)
PLATELET # BLD AUTO: 156 K/UL — SIGNIFICANT CHANGE UP (ref 150–400)
POTASSIUM SERPL-MCNC: 4.1 MMOL/L — SIGNIFICANT CHANGE UP (ref 3.5–5.3)
POTASSIUM SERPL-MCNC: 4.1 MMOL/L — SIGNIFICANT CHANGE UP (ref 3.5–5.3)
POTASSIUM SERPL-SCNC: 4.1 MMOL/L — SIGNIFICANT CHANGE UP (ref 3.5–5.3)
POTASSIUM SERPL-SCNC: 4.1 MMOL/L — SIGNIFICANT CHANGE UP (ref 3.5–5.3)
RBC # BLD: 3.46 M/UL — LOW (ref 3.8–5.2)
RBC # BLD: 3.46 M/UL — LOW (ref 3.8–5.2)
RBC # FLD: 19.3 % — HIGH (ref 10.3–14.5)
RBC # FLD: 19.3 % — HIGH (ref 10.3–14.5)
S AUREUS DNA NOSE QL NAA+PROBE: SIGNIFICANT CHANGE UP
S AUREUS DNA NOSE QL NAA+PROBE: SIGNIFICANT CHANGE UP
S PNEUM AG UR QL: NEGATIVE — SIGNIFICANT CHANGE UP
S PNEUM AG UR QL: NEGATIVE — SIGNIFICANT CHANGE UP
SODIUM SERPL-SCNC: 136 MMOL/L — SIGNIFICANT CHANGE UP (ref 135–145)
SODIUM SERPL-SCNC: 136 MMOL/L — SIGNIFICANT CHANGE UP (ref 135–145)
TSH SERPL-MCNC: 0.61 UIU/ML — SIGNIFICANT CHANGE UP (ref 0.27–4.2)
TSH SERPL-MCNC: 0.61 UIU/ML — SIGNIFICANT CHANGE UP (ref 0.27–4.2)
WBC # BLD: 10.56 K/UL — HIGH (ref 3.8–10.5)
WBC # BLD: 10.56 K/UL — HIGH (ref 3.8–10.5)
WBC # FLD AUTO: 10.56 K/UL — HIGH (ref 3.8–10.5)
WBC # FLD AUTO: 10.56 K/UL — HIGH (ref 3.8–10.5)

## 2023-11-04 PROCEDURE — 99222 1ST HOSP IP/OBS MODERATE 55: CPT

## 2023-11-04 RX ORDER — VITAMIN E 100 UNIT
800 CAPSULE ORAL DAILY
Refills: 0 | Status: DISCONTINUED | OUTPATIENT
Start: 2023-11-04 | End: 2023-11-07

## 2023-11-04 RX ORDER — LEVOTHYROXINE SODIUM 125 MCG
112 TABLET ORAL DAILY
Refills: 0 | Status: DISCONTINUED | OUTPATIENT
Start: 2023-11-04 | End: 2023-11-07

## 2023-11-04 RX ORDER — AZITHROMYCIN 500 MG/1
500 TABLET, FILM COATED ORAL EVERY 24 HOURS
Refills: 0 | Status: DISCONTINUED | OUTPATIENT
Start: 2023-11-04 | End: 2023-11-07

## 2023-11-04 RX ORDER — INSULIN LISPRO 100/ML
VIAL (ML) SUBCUTANEOUS
Refills: 0 | Status: DISCONTINUED | OUTPATIENT
Start: 2023-11-03 | End: 2023-11-07

## 2023-11-04 RX ORDER — SODIUM CHLORIDE 9 MG/ML
1000 INJECTION, SOLUTION INTRAVENOUS
Refills: 0 | Status: DISCONTINUED | OUTPATIENT
Start: 2023-11-04 | End: 2023-11-07

## 2023-11-04 RX ORDER — CEFTRIAXONE 500 MG/1
1000 INJECTION, POWDER, FOR SOLUTION INTRAMUSCULAR; INTRAVENOUS EVERY 24 HOURS
Refills: 0 | Status: DISCONTINUED | OUTPATIENT
Start: 2023-11-04 | End: 2023-11-07

## 2023-11-04 RX ORDER — LANOLIN ALCOHOL/MO/W.PET/CERES
3 CREAM (GRAM) TOPICAL AT BEDTIME
Refills: 0 | Status: DISCONTINUED | OUTPATIENT
Start: 2023-11-03 | End: 2023-11-06

## 2023-11-04 RX ORDER — ACETAMINOPHEN 500 MG
650 TABLET ORAL EVERY 6 HOURS
Refills: 0 | Status: DISCONTINUED | OUTPATIENT
Start: 2023-11-03 | End: 2023-11-07

## 2023-11-04 RX ORDER — DEXTROSE 50 % IN WATER 50 %
25 SYRINGE (ML) INTRAVENOUS ONCE
Refills: 0 | Status: DISCONTINUED | OUTPATIENT
Start: 2023-11-04 | End: 2023-11-07

## 2023-11-04 RX ORDER — ATORVASTATIN CALCIUM 80 MG/1
40 TABLET, FILM COATED ORAL AT BEDTIME
Refills: 0 | Status: DISCONTINUED | OUTPATIENT
Start: 2023-11-04 | End: 2023-11-04

## 2023-11-04 RX ORDER — SODIUM CHLORIDE 9 MG/ML
3 INJECTION INTRAMUSCULAR; INTRAVENOUS; SUBCUTANEOUS EVERY 8 HOURS
Refills: 0 | Status: DISCONTINUED | OUTPATIENT
Start: 2023-11-03 | End: 2023-11-07

## 2023-11-04 RX ORDER — VITAMIN E 100 UNIT
1000 CAPSULE ORAL DAILY
Refills: 0 | Status: DISCONTINUED | OUTPATIENT
Start: 2023-11-04 | End: 2023-11-04

## 2023-11-04 RX ORDER — GLUCAGON INJECTION, SOLUTION 0.5 MG/.1ML
1 INJECTION, SOLUTION SUBCUTANEOUS ONCE
Refills: 0 | Status: DISCONTINUED | OUTPATIENT
Start: 2023-11-04 | End: 2023-11-07

## 2023-11-04 RX ORDER — MORPHINE SULFATE 50 MG/1
2 CAPSULE, EXTENDED RELEASE ORAL EVERY 4 HOURS
Refills: 0 | Status: DISCONTINUED | OUTPATIENT
Start: 2023-11-04 | End: 2023-11-07

## 2023-11-04 RX ORDER — PENTOXIFYLLINE 400 MG
400 TABLET, EXTENDED RELEASE ORAL DAILY
Refills: 0 | Status: DISCONTINUED | OUTPATIENT
Start: 2023-11-04 | End: 2023-11-07

## 2023-11-04 RX ORDER — ROSUVASTATIN CALCIUM 5 MG/1
1 TABLET ORAL
Refills: 0 | DISCHARGE

## 2023-11-04 RX ORDER — SODIUM CHLORIDE 9 MG/ML
1000 INJECTION INTRAMUSCULAR; INTRAVENOUS; SUBCUTANEOUS
Refills: 0 | Status: DISCONTINUED | OUTPATIENT
Start: 2023-11-04 | End: 2023-11-04

## 2023-11-04 RX ORDER — ONDANSETRON 8 MG/1
4 TABLET, FILM COATED ORAL EVERY 8 HOURS
Refills: 0 | Status: DISCONTINUED | OUTPATIENT
Start: 2023-11-03 | End: 2023-11-07

## 2023-11-04 RX ORDER — DEXTROSE 50 % IN WATER 50 %
15 SYRINGE (ML) INTRAVENOUS ONCE
Refills: 0 | Status: DISCONTINUED | OUTPATIENT
Start: 2023-11-04 | End: 2023-11-07

## 2023-11-04 RX ORDER — ENOXAPARIN SODIUM 100 MG/ML
40 INJECTION SUBCUTANEOUS EVERY 24 HOURS
Refills: 0 | Status: DISCONTINUED | OUTPATIENT
Start: 2023-11-04 | End: 2023-11-07

## 2023-11-04 RX ADMIN — Medication 800 INTERNATIONAL UNIT(S): at 15:45

## 2023-11-04 RX ADMIN — SODIUM CHLORIDE 125 MILLILITER(S): 9 INJECTION INTRAMUSCULAR; INTRAVENOUS; SUBCUTANEOUS at 01:13

## 2023-11-04 RX ADMIN — AZITHROMYCIN 500 MILLIGRAM(S): 500 TABLET, FILM COATED ORAL at 01:13

## 2023-11-04 RX ADMIN — CEFTRIAXONE 1000 MILLIGRAM(S): 500 INJECTION, POWDER, FOR SOLUTION INTRAMUSCULAR; INTRAVENOUS at 21:19

## 2023-11-04 RX ADMIN — MORPHINE SULFATE 2 MILLIGRAM(S): 50 CAPSULE, EXTENDED RELEASE ORAL at 17:12

## 2023-11-04 RX ADMIN — Medication 112 MICROGRAM(S): at 05:53

## 2023-11-04 RX ADMIN — MORPHINE SULFATE 2 MILLIGRAM(S): 50 CAPSULE, EXTENDED RELEASE ORAL at 05:54

## 2023-11-04 RX ADMIN — Medication 100 MILLIGRAM(S): at 16:12

## 2023-11-04 RX ADMIN — SODIUM CHLORIDE 3 MILLILITER(S): 9 INJECTION INTRAMUSCULAR; INTRAVENOUS; SUBCUTANEOUS at 22:11

## 2023-11-04 RX ADMIN — SODIUM CHLORIDE 1000 MILLILITER(S): 9 INJECTION INTRAMUSCULAR; INTRAVENOUS; SUBCUTANEOUS at 00:13

## 2023-11-04 RX ADMIN — Medication 400 MILLIGRAM(S): at 15:45

## 2023-11-04 RX ADMIN — SODIUM CHLORIDE 3 MILLILITER(S): 9 INJECTION INTRAMUSCULAR; INTRAVENOUS; SUBCUTANEOUS at 13:58

## 2023-11-04 RX ADMIN — ENOXAPARIN SODIUM 40 MILLIGRAM(S): 100 INJECTION SUBCUTANEOUS at 16:13

## 2023-11-04 RX ADMIN — AZITHROMYCIN 255 MILLIGRAM(S): 500 TABLET, FILM COATED ORAL at 21:19

## 2023-11-04 RX ADMIN — MORPHINE SULFATE 2 MILLIGRAM(S): 50 CAPSULE, EXTENDED RELEASE ORAL at 16:12

## 2023-11-04 RX ADMIN — MORPHINE SULFATE 2 MILLIGRAM(S): 50 CAPSULE, EXTENDED RELEASE ORAL at 23:25

## 2023-11-04 NOTE — H&P ADULT - ASSESSMENT
69 y/o female with CAP, Hx of Lymphoma, HLD, Hypothyroidism, Remote hx of Breast/lung cancer, NHL    Multi-focal CAP:  -Port 3  -CTA as above  -PNA order set  -Chest PT/incentive spirometer  -Trend WBC/ temp curve  -F/U cultures/serologies  -Nebs  -Cont. CAP coverage with Rocephin/Zithromax  -OOB, ambulate, fall risk  -Supplemental 02    HLD:  -Statin     Lymphoma:  -F/U w/ [facility] as scheduled     Hypothyroidism:  -Cont. Synthroid     Discussed with ED staff, Patient  67 y/o female with CAP, Hx of Lymphoma, HLD, Hypothyroidism, Remote hx of Breast/lung cancer, NHL    Multi-focal CAP:  -Port 3  -CTA as above  -PNA order set  -Chest PT/incentive spirometer  -Trend WBC/ temp curve  -F/U cultures/serologies  -Nebs  -Cont. CAP coverage with Rocephin/Zithromax  -OOB, ambulate, fall risk  -Supplemental 02    HLD:  -Statin     Lymphoma:  -F/U w/ [facility] as scheduled for immunotherapy     Hypothyroidism:  -Cont. Synthroid     Discussed with ED staff, Patient

## 2023-11-04 NOTE — H&P ADULT - HISTORY OF PRESENT ILLNESS
67 y/o female with hx of NHL s/p treatment in the 80s, Breast cancer s/p surgery without post operative chemo/XRTin 2020, lung cancer s/p surgery without post xrt/chemo in 2015, currently on immunotherapy for EBV related B cell lymphoma at [facility] [place], Hypothyroidism, HTN, Diet controlled DM-2. Patient  67 y/o female with hx of NHL s/p treatment in the 80s, Breast cancer s/p surgery without post operative chemo/XRTin 2020, lung cancer s/p surgery without post xrt/chemo in 2015, currently on immunotherapy for EBV related B cell lymphoma at [facility] [place], Hypothyroidism, HTN, Diet controlled DM-2. Patient presents to ED c/o chest tightness and cough. Denies any sick contacts, N/V, hemoptysis,  67 y/o female with hx of NHL s/p treatment in the 80s, Breast cancer s/p surgery without post operative chemo/XRTin 2020, lung cancer s/p surgery without post xrt/chemo in 2015, currently on immunotherapy for EBV related B cell lymphoma at [facility] [place], Hypothyroidism, HTN, Diet controlled DM-2. Patient presents to ED c/o chest tightness and cough. Denies any sick contacts, N/V, hemoptysis, In ED vitals with mild tachycardia otherwise stable, RVP neg. CTA without PE, but does show evidence of multifocal PNA. Mild hypoxia noted on RA and leucocytosis. She was given IVF, cultured, and given broad spectrum IV abx.  69 y/o female with hx of NHL s/p treatment in the 80s, Breast cancer s/p surgery without post operative chemo/XRTin 2020, lung cancer s/p surgery without post xrt/chemo in 2015, currently on immunotherapy for EBV related B cell lymphoma at [facility] [place], Hypothyroidism, HTN, Diet controlled DM-2. Patient presents to ED c/o chest tightness and cough. Denies any sick contacts, N/V, hemoptysis, In ED vitals with mild tachycardia otherwise stable, RVP neg. CTA without PE, but does show evidence of multifocal PNA. Mild hypoxia noted on RA and leucocytosis. She was given IVF, cultured, and given broad spectrum IV abx. denies any other medical complaints at this time.

## 2023-11-04 NOTE — H&P ADULT - NSICDXPASTMEDICALHX_GEN_ALL_CORE_FT
PAST MEDICAL HISTORY:  Breast cancer     H/O: HTN (hypertension)     HLD (hyperlipidemia)     Lung cancer     Lymphoma

## 2023-11-04 NOTE — PATIENT PROFILE ADULT - HOME ACCESSIBILITY CONCERNS
Lab Results   Component Value Date    HGBA1C 6.4 (H) 09/27/2019     Current meds: None  Foot exam completed- Yes  Eye exam completed- Yes  Microalbumin completed- No  On statin therapy- no  On ACEI/ARB- yes  -previously uncontrolled at 9 but able to return to goal thru diet alone. Due for repeat A1C. Does not routinely check glucose  
-at goal today  -currently on lisinopril 10 mg  -denies adverse effects of medications  -continue lifestyle modification with low sodium diet and exercise   
-chronic hx of lower back pain  -using heat and PRN tylenol  -denies hx of any invasive treatment  
-chronic lower extremity myalgias  -no abnormal findings on exam.  Good pulses and cap refill  -check electrolytes, CKD, inflammatory markers, UA  -likely associated to her prolonged standing on hard surfaces.  -discussed using Tylenol or Advil as needed, heat application and rest whenever she is able to  
-diagnosed in 2015  -s/p hysterectomy and XRT  -did not require CTX  -due for follow up with [facility]  
-last vitamin d level-23 in 8/2019  -remains on 50k weekly  -due for repeat level  
Complete history and physical was completed today.  Complete and thorough medication reconciliation was performed.  Discussed risks and benefits of medications.  Advised patient on orders and health maintenance.  We discussed old records and old labs if available.  Will request any records not available through epic.  Continue current medications listed on your summary sheet.  
none

## 2023-11-04 NOTE — CHART NOTE - NSCHARTNOTEFT_GEN_A_CORE
69 y/o female with CAP, Hx of Lymphoma, HLD, Hypothyroidism, Remote hx of Breast/lung cancer, NHL    Assessment/Plan:    1. Multifocal pneumonia  - CT with RLL mass vs consolidation- Repeat short term CT chest   - IV Rocephin and azithromycin  - COVID/RVP negative  - CTA negative for PE  - SPo2 99% on 2 liters NC, wean as tolerated    2. Transaminits  - Noted on 09/2023 with elevated LFTS  - CT abdomen negative: RUQ USG with gallstones  - Hold zocor  - Repeat USh  - Trend LFTS    3. HLD:  -Statin     4. Lymphoma:  -F/U w/ [facility] as scheduled for immunotherapy     5 Hypothyroidism:  -Cont. Synthroid

## 2023-11-05 LAB
ALBUMIN SERPL ELPH-MCNC: 2.9 G/DL — LOW (ref 3.3–5.2)
ALBUMIN SERPL ELPH-MCNC: 2.9 G/DL — LOW (ref 3.3–5.2)
ALP SERPL-CCNC: 421 U/L — HIGH (ref 40–120)
ALP SERPL-CCNC: 421 U/L — HIGH (ref 40–120)
ALT FLD-CCNC: 73 U/L — HIGH
ALT FLD-CCNC: 73 U/L — HIGH
ANION GAP SERPL CALC-SCNC: 11 MMOL/L — SIGNIFICANT CHANGE UP (ref 5–17)
ANION GAP SERPL CALC-SCNC: 11 MMOL/L — SIGNIFICANT CHANGE UP (ref 5–17)
AST SERPL-CCNC: 65 U/L — HIGH
AST SERPL-CCNC: 65 U/L — HIGH
BASOPHILS # BLD AUTO: 0.04 K/UL — SIGNIFICANT CHANGE UP (ref 0–0.2)
BASOPHILS # BLD AUTO: 0.04 K/UL — SIGNIFICANT CHANGE UP (ref 0–0.2)
BASOPHILS NFR BLD AUTO: 0.5 % — SIGNIFICANT CHANGE UP (ref 0–2)
BASOPHILS NFR BLD AUTO: 0.5 % — SIGNIFICANT CHANGE UP (ref 0–2)
BILIRUB SERPL-MCNC: 0.4 MG/DL — SIGNIFICANT CHANGE UP (ref 0.4–2)
BILIRUB SERPL-MCNC: 0.4 MG/DL — SIGNIFICANT CHANGE UP (ref 0.4–2)
BUN SERPL-MCNC: 9.8 MG/DL — SIGNIFICANT CHANGE UP (ref 8–20)
BUN SERPL-MCNC: 9.8 MG/DL — SIGNIFICANT CHANGE UP (ref 8–20)
CALCIUM SERPL-MCNC: 8.3 MG/DL — LOW (ref 8.4–10.5)
CALCIUM SERPL-MCNC: 8.3 MG/DL — LOW (ref 8.4–10.5)
CHLORIDE SERPL-SCNC: 96 MMOL/L — SIGNIFICANT CHANGE UP (ref 96–108)
CHLORIDE SERPL-SCNC: 96 MMOL/L — SIGNIFICANT CHANGE UP (ref 96–108)
CO2 SERPL-SCNC: 26 MMOL/L — SIGNIFICANT CHANGE UP (ref 22–29)
CO2 SERPL-SCNC: 26 MMOL/L — SIGNIFICANT CHANGE UP (ref 22–29)
CREAT SERPL-MCNC: 0.45 MG/DL — LOW (ref 0.5–1.3)
CREAT SERPL-MCNC: 0.45 MG/DL — LOW (ref 0.5–1.3)
EGFR: 103 ML/MIN/1.73M2 — SIGNIFICANT CHANGE UP
EGFR: 103 ML/MIN/1.73M2 — SIGNIFICANT CHANGE UP
EOSINOPHIL # BLD AUTO: 0.04 K/UL — SIGNIFICANT CHANGE UP (ref 0–0.5)
EOSINOPHIL # BLD AUTO: 0.04 K/UL — SIGNIFICANT CHANGE UP (ref 0–0.5)
EOSINOPHIL NFR BLD AUTO: 0.5 % — SIGNIFICANT CHANGE UP (ref 0–6)
EOSINOPHIL NFR BLD AUTO: 0.5 % — SIGNIFICANT CHANGE UP (ref 0–6)
GLUCOSE BLDC GLUCOMTR-MCNC: 107 MG/DL — HIGH (ref 70–99)
GLUCOSE BLDC GLUCOMTR-MCNC: 107 MG/DL — HIGH (ref 70–99)
GLUCOSE BLDC GLUCOMTR-MCNC: 115 MG/DL — HIGH (ref 70–99)
GLUCOSE BLDC GLUCOMTR-MCNC: 115 MG/DL — HIGH (ref 70–99)
GLUCOSE BLDC GLUCOMTR-MCNC: 116 MG/DL — HIGH (ref 70–99)
GLUCOSE BLDC GLUCOMTR-MCNC: 116 MG/DL — HIGH (ref 70–99)
GLUCOSE BLDC GLUCOMTR-MCNC: 95 MG/DL — SIGNIFICANT CHANGE UP (ref 70–99)
GLUCOSE BLDC GLUCOMTR-MCNC: 95 MG/DL — SIGNIFICANT CHANGE UP (ref 70–99)
GLUCOSE SERPL-MCNC: 94 MG/DL — SIGNIFICANT CHANGE UP (ref 70–99)
GLUCOSE SERPL-MCNC: 94 MG/DL — SIGNIFICANT CHANGE UP (ref 70–99)
HCT VFR BLD CALC: 30.6 % — LOW (ref 34.5–45)
HCT VFR BLD CALC: 30.6 % — LOW (ref 34.5–45)
HCV AB S/CO SERPL IA: 0.11 S/CO — SIGNIFICANT CHANGE UP (ref 0–0.99)
HCV AB S/CO SERPL IA: 0.11 S/CO — SIGNIFICANT CHANGE UP (ref 0–0.99)
HCV AB SERPL-IMP: SIGNIFICANT CHANGE UP
HCV AB SERPL-IMP: SIGNIFICANT CHANGE UP
HGB BLD-MCNC: 10.1 G/DL — LOW (ref 11.5–15.5)
HGB BLD-MCNC: 10.1 G/DL — LOW (ref 11.5–15.5)
IMM GRANULOCYTES NFR BLD AUTO: 0.3 % — SIGNIFICANT CHANGE UP (ref 0–0.9)
IMM GRANULOCYTES NFR BLD AUTO: 0.3 % — SIGNIFICANT CHANGE UP (ref 0–0.9)
LYMPHOCYTES # BLD AUTO: 6.12 K/UL — HIGH (ref 1–3.3)
LYMPHOCYTES # BLD AUTO: 6.12 K/UL — HIGH (ref 1–3.3)
LYMPHOCYTES # BLD AUTO: 69 % — HIGH (ref 13–44)
LYMPHOCYTES # BLD AUTO: 69 % — HIGH (ref 13–44)
MCHC RBC-ENTMCNC: 28.9 PG — SIGNIFICANT CHANGE UP (ref 27–34)
MCHC RBC-ENTMCNC: 28.9 PG — SIGNIFICANT CHANGE UP (ref 27–34)
MCHC RBC-ENTMCNC: 33 GM/DL — SIGNIFICANT CHANGE UP (ref 32–36)
MCHC RBC-ENTMCNC: 33 GM/DL — SIGNIFICANT CHANGE UP (ref 32–36)
MCV RBC AUTO: 87.7 FL — SIGNIFICANT CHANGE UP (ref 80–100)
MCV RBC AUTO: 87.7 FL — SIGNIFICANT CHANGE UP (ref 80–100)
MONOCYTES # BLD AUTO: 0.77 K/UL — SIGNIFICANT CHANGE UP (ref 0–0.9)
MONOCYTES # BLD AUTO: 0.77 K/UL — SIGNIFICANT CHANGE UP (ref 0–0.9)
MONOCYTES NFR BLD AUTO: 8.7 % — SIGNIFICANT CHANGE UP (ref 2–14)
MONOCYTES NFR BLD AUTO: 8.7 % — SIGNIFICANT CHANGE UP (ref 2–14)
NEUTROPHILS # BLD AUTO: 1.87 K/UL — SIGNIFICANT CHANGE UP (ref 1.8–7.4)
NEUTROPHILS # BLD AUTO: 1.87 K/UL — SIGNIFICANT CHANGE UP (ref 1.8–7.4)
NEUTROPHILS NFR BLD AUTO: 21 % — LOW (ref 43–77)
NEUTROPHILS NFR BLD AUTO: 21 % — LOW (ref 43–77)
PLATELET # BLD AUTO: 155 K/UL — SIGNIFICANT CHANGE UP (ref 150–400)
PLATELET # BLD AUTO: 155 K/UL — SIGNIFICANT CHANGE UP (ref 150–400)
POTASSIUM SERPL-MCNC: 4.2 MMOL/L — SIGNIFICANT CHANGE UP (ref 3.5–5.3)
POTASSIUM SERPL-MCNC: 4.2 MMOL/L — SIGNIFICANT CHANGE UP (ref 3.5–5.3)
POTASSIUM SERPL-SCNC: 4.2 MMOL/L — SIGNIFICANT CHANGE UP (ref 3.5–5.3)
POTASSIUM SERPL-SCNC: 4.2 MMOL/L — SIGNIFICANT CHANGE UP (ref 3.5–5.3)
PROT SERPL-MCNC: 7 G/DL — SIGNIFICANT CHANGE UP (ref 6.6–8.7)
PROT SERPL-MCNC: 7 G/DL — SIGNIFICANT CHANGE UP (ref 6.6–8.7)
RBC # BLD: 3.49 M/UL — LOW (ref 3.8–5.2)
RBC # BLD: 3.49 M/UL — LOW (ref 3.8–5.2)
RBC # FLD: 18.8 % — HIGH (ref 10.3–14.5)
RBC # FLD: 18.8 % — HIGH (ref 10.3–14.5)
SODIUM SERPL-SCNC: 133 MMOL/L — LOW (ref 135–145)
SODIUM SERPL-SCNC: 133 MMOL/L — LOW (ref 135–145)
WBC # BLD: 8.87 K/UL — SIGNIFICANT CHANGE UP (ref 3.8–10.5)
WBC # BLD: 8.87 K/UL — SIGNIFICANT CHANGE UP (ref 3.8–10.5)
WBC # FLD AUTO: 8.87 K/UL — SIGNIFICANT CHANGE UP (ref 3.8–10.5)
WBC # FLD AUTO: 8.87 K/UL — SIGNIFICANT CHANGE UP (ref 3.8–10.5)

## 2023-11-05 PROCEDURE — 76700 US EXAM ABDOM COMPLETE: CPT | Mod: 26

## 2023-11-05 PROCEDURE — 99233 SBSQ HOSP IP/OBS HIGH 50: CPT

## 2023-11-05 RX ORDER — IPRATROPIUM/ALBUTEROL SULFATE 18-103MCG
3 AEROSOL WITH ADAPTER (GRAM) INHALATION EVERY 6 HOURS
Refills: 0 | Status: DISCONTINUED | OUTPATIENT
Start: 2023-11-05 | End: 2023-11-07

## 2023-11-05 RX ORDER — BENZOCAINE AND MENTHOL 5; 1 G/100ML; G/100ML
1 LIQUID ORAL ONCE
Refills: 0 | Status: COMPLETED | OUTPATIENT
Start: 2023-11-05 | End: 2023-11-05

## 2023-11-05 RX ORDER — BENZOCAINE AND MENTHOL 5; 1 G/100ML; G/100ML
1 LIQUID ORAL
Refills: 0 | Status: DISCONTINUED | OUTPATIENT
Start: 2023-11-05 | End: 2023-11-07

## 2023-11-05 RX ORDER — LORATADINE 10 MG/1
10 TABLET ORAL DAILY
Refills: 0 | Status: DISCONTINUED | OUTPATIENT
Start: 2023-11-05 | End: 2023-11-07

## 2023-11-05 RX ADMIN — MORPHINE SULFATE 2 MILLIGRAM(S): 50 CAPSULE, EXTENDED RELEASE ORAL at 00:00

## 2023-11-05 RX ADMIN — SODIUM CHLORIDE 3 MILLILITER(S): 9 INJECTION INTRAMUSCULAR; INTRAVENOUS; SUBCUTANEOUS at 22:15

## 2023-11-05 RX ADMIN — SODIUM CHLORIDE 3 MILLILITER(S): 9 INJECTION INTRAMUSCULAR; INTRAVENOUS; SUBCUTANEOUS at 12:45

## 2023-11-05 RX ADMIN — BENZOCAINE AND MENTHOL 1 LOZENGE: 5; 1 LIQUID ORAL at 17:39

## 2023-11-05 RX ADMIN — LORATADINE 10 MILLIGRAM(S): 10 TABLET ORAL at 21:36

## 2023-11-05 RX ADMIN — Medication 112 MICROGRAM(S): at 05:30

## 2023-11-05 RX ADMIN — ENOXAPARIN SODIUM 40 MILLIGRAM(S): 100 INJECTION SUBCUTANEOUS at 17:34

## 2023-11-05 RX ADMIN — SODIUM CHLORIDE 3 MILLILITER(S): 9 INJECTION INTRAMUSCULAR; INTRAVENOUS; SUBCUTANEOUS at 05:43

## 2023-11-05 RX ADMIN — Medication 3 MILLILITER(S): at 20:17

## 2023-11-05 RX ADMIN — Medication 800 INTERNATIONAL UNIT(S): at 12:49

## 2023-11-05 RX ADMIN — Medication 3 MILLILITER(S): at 09:30

## 2023-11-05 RX ADMIN — AZITHROMYCIN 255 MILLIGRAM(S): 500 TABLET, FILM COATED ORAL at 21:37

## 2023-11-05 RX ADMIN — BENZOCAINE AND MENTHOL 1 LOZENGE: 5; 1 LIQUID ORAL at 14:12

## 2023-11-05 RX ADMIN — Medication 400 MILLIGRAM(S): at 12:49

## 2023-11-05 RX ADMIN — CEFTRIAXONE 1000 MILLIGRAM(S): 500 INJECTION, POWDER, FOR SOLUTION INTRAMUSCULAR; INTRAVENOUS at 21:37

## 2023-11-05 NOTE — PROGRESS NOTE ADULT - ASSESSMENT
69 y/o female with CAP, Hx of Lymphoma, HLD, Hypothyroidism, Remote hx of Breast/lung cancer, NHL      1. Multifocal pneumonia  - CT with RLL mass vs consolidation- Repeat short term CT chest   - IV Rocephin and azithromycin  - COVID/RVP negative  - CTA negative for PE  - SPo2 99% on 2 liters NC, wean as tolerated    2. Transaminits  - Noted on 09/2023 with elevated LFTS  - CT abdomen negative: RUQ USG with gallstones  - Hold zocor  - Repeat USh  - Trend LFTS    3. HLD:  -Statin     4. Lymphoma:  -F/U w/ [facility] as scheduled for immunotherapy     5 Hypothyroidism:  -Cont. Synthroid.     67 y/o female with CAP, Hx of Lymphoma, HLD, Hypothyroidism, Remote hx of Breast/lung cancer, NHL      1. Multifocal pneumonia  - CT with RLL mass vs consolidation- Repeat short term CT chest needed   cont IV Rocephin and azithromycin  - CTA negative for PE  - SPo2 99% on 2 liters NC, wean as tolerated    2. Transaminits  - Noted on 09/2023 with elevated LFTS  - CT abdomen negative: RUQ USG with gallstones  - Hold zocor  - Trend LFTS    3. HLD:  -Statin     4. Lymphoma:  -F/U w/ [facility] as scheduled for immunotherapy     5 Hypothyroidism:  -Cont. Synthroid.    discharge in 2-3 days likely

## 2023-11-05 NOTE — PROGRESS NOTE ADULT - SUBJECTIVE AND OBJECTIVE BOX
Patient is a 68y old  Female who presents with a chief complaint of PNA (04 Nov 2023 02:48)      Patient seen and examined at bedside. No overnight events reported.     ALLERGIES:  No Known Allergies    MEDICATIONS  (STANDING):  azithromycin  IVPB 500 milliGRAM(s) IV Intermittent every 24 hours  cefTRIAXone Injectable. 1000 milliGRAM(s) IV Push every 24 hours  dextrose 5%. 1000 milliLiter(s) (50 mL/Hr) IV Continuous <Continuous>  dextrose 50% Injectable 25 Gram(s) IV Push once  enoxaparin Injectable 40 milliGRAM(s) SubCutaneous every 24 hours  glucagon  Injectable 1 milliGRAM(s) IntraMuscular once  insulin lispro (ADMELOG) corrective regimen sliding scale   SubCutaneous Before meals and at bedtime  levothyroxine 112 MICROGram(s) Oral daily  pentoxifylline 400 milliGRAM(s) Oral daily  sodium chloride 0.9% lock flush 3 milliLiter(s) IV Push every 8 hours  vitamin E 800 International Unit(s) Oral daily    MEDICATIONS  (PRN):  acetaminophen     Tablet .. 650 milliGRAM(s) Oral every 6 hours PRN Temp greater or equal to 38C (100.4F), Mild Pain (1 - 3)  aluminum hydroxide/magnesium hydroxide/simethicone Suspension 30 milliLiter(s) Oral every 4 hours PRN Dyspepsia  benzonatate 100 milliGRAM(s) Oral three times a day PRN Cough  dextrose Oral Gel 15 Gram(s) Oral once PRN Blood Glucose LESS THAN 70 milliGRAM(s)/deciliter  melatonin 3 milliGRAM(s) Oral at bedtime PRN Insomnia  morphine  - Injectable 2 milliGRAM(s) IV Push every 4 hours PRN Moderate Pain (4 - 6)  ondansetron Injectable 4 milliGRAM(s) IV Push every 8 hours PRN Nausea and/or Vomiting    Vital Signs Last 24 Hrs  T(F): 98 (05 Nov 2023 04:31), Max: 98.9 (04 Nov 2023 16:06)  HR: 89 (05 Nov 2023 04:31) (77 - 105)  BP: 131/77 (05 Nov 2023 04:31) (122/76 - 155/91)  RR: 18 (05 Nov 2023 04:31) (18 - 20)  SpO2: 98% (05 Nov 2023 04:31) (93% - 99%)  I&O's Summary    04 Nov 2023 08:01  -  05 Nov 2023 07:00  --------------------------------------------------------  IN: 480 mL / OUT: 400 mL / NET: 80 mL        PHYSICAL EXAM:  General:   ENT:   Neck:   Lungs:   Cardio: RRR, S1/S2, No murmur  Abdomen: Soft, Nontender, Nondistended; Bowel sounds present  Extremities: No calf tenderness, No pitting edema    LABS:                        10.1   8.87  )-----------( 155      ( 05 Nov 2023 07:10 )             30.6     11-04    136  |  103  |  10.0  ----------------------------<  94  4.1   |  25.0  |  0.42    Ca    7.9      04 Nov 2023 06:46    TPro  8.2  /  Alb  3.3  /  TBili  0.5  /  DBili  x   /  AST  110  /  ALT  111  /  AlkPhos  536  11-03          PT/INR - ( 03 Nov 2023 16:39 )   PT: 11.0 sec;   INR: 0.99 ratio         PTT - ( 03 Nov 2023 16:39 )  PTT:32.5 sec            TSH 0.61   TSH with FT4 reflex --  Total T3 --              POCT Blood Glucose.: 118 mg/dL (04 Nov 2023 21:15)  POCT Blood Glucose.: 128 mg/dL (04 Nov 2023 16:24)  POCT Blood Glucose.: 105 mg/dL (04 Nov 2023 12:09)  POCT Blood Glucose.: 91 mg/dL (04 Nov 2023 08:13)      Urinalysis Basic - ( 04 Nov 2023 06:46 )    Color: x / Appearance: x / SG: x / pH: x  Gluc: 94 mg/dL / Ketone: x  / Bili: x / Urobili: x   Blood: x / Protein: x / Nitrite: x   Leuk Esterase: x / RBC: x / WBC x   Sq Epi: x / Non Sq Epi: x / Bacteria: x        Culture - Blood (collected 03 Nov 2023 23:25)  Source: .Blood Blood-Peripheral  Preliminary Report (05 Nov 2023 08:01):    No growth at 24 hours        RADIOLOGY & ADDITIONAL TESTS:       Patient is a 68y old  Female who presents with a chief complaint of PNA (04 Nov 2023 02:48)      Patient seen and examined at bedside  c/o sore throat, + cough         ALLERGIES:  No Known Allergies    MEDICATIONS  (STANDING):  azithromycin  IVPB 500 milliGRAM(s) IV Intermittent every 24 hours  cefTRIAXone Injectable. 1000 milliGRAM(s) IV Push every 24 hours  dextrose 5%. 1000 milliLiter(s) (50 mL/Hr) IV Continuous <Continuous>  dextrose 50% Injectable 25 Gram(s) IV Push once  enoxaparin Injectable 40 milliGRAM(s) SubCutaneous every 24 hours  glucagon  Injectable 1 milliGRAM(s) IntraMuscular once  insulin lispro (ADMELOG) corrective regimen sliding scale   SubCutaneous Before meals and at bedtime  levothyroxine 112 MICROGram(s) Oral daily  pentoxifylline 400 milliGRAM(s) Oral daily  sodium chloride 0.9% lock flush 3 milliLiter(s) IV Push every 8 hours  vitamin E 800 International Unit(s) Oral daily    MEDICATIONS  (PRN):  acetaminophen     Tablet .. 650 milliGRAM(s) Oral every 6 hours PRN Temp greater or equal to 38C (100.4F), Mild Pain (1 - 3)  aluminum hydroxide/magnesium hydroxide/simethicone Suspension 30 milliLiter(s) Oral every 4 hours PRN Dyspepsia  benzonatate 100 milliGRAM(s) Oral three times a day PRN Cough  dextrose Oral Gel 15 Gram(s) Oral once PRN Blood Glucose LESS THAN 70 milliGRAM(s)/deciliter  melatonin 3 milliGRAM(s) Oral at bedtime PRN Insomnia  morphine  - Injectable 2 milliGRAM(s) IV Push every 4 hours PRN Moderate Pain (4 - 6)  ondansetron Injectable 4 milliGRAM(s) IV Push every 8 hours PRN Nausea and/or Vomiting    Vital Signs Last 24 Hrs  T(F): 98 (05 Nov 2023 04:31), Max: 98.9 (04 Nov 2023 16:06)  HR: 89 (05 Nov 2023 04:31) (77 - 105)  BP: 131/77 (05 Nov 2023 04:31) (122/76 - 155/91)  RR: 18 (05 Nov 2023 04:31) (18 - 20)  SpO2: 98% (05 Nov 2023 04:31) (93% - 99%)  I&O's Summary    04 Nov 2023 08:01  -  05 Nov 2023 07:00  --------------------------------------------------------  IN: 480 mL / OUT: 400 mL / NET: 80 mL        PHYSICAL EXAM:  General: awake alert   Neck: supple , no JVD   Lungs: CTA bilateral   Cardio: RRR, S1/S2, No murmur  Abdomen: Soft, Nontender, Nondistended; Bowel sounds present  Extremities: No calf tenderness, No pitting edema    LABS:                        10.1   8.87  )-----------( 155      ( 05 Nov 2023 07:10 )             30.6     11-04    136  |  103  |  10.0  ----------------------------<  94  4.1   |  25.0  |  0.42    Ca    7.9      04 Nov 2023 06:46    TPro  8.2  /  Alb  3.3  /  TBili  0.5  /  DBili  x   /  AST  110  /  ALT  111  /  AlkPhos  536  11-03          PT/INR - ( 03 Nov 2023 16:39 )   PT: 11.0 sec;   INR: 0.99 ratio         PTT - ( 03 Nov 2023 16:39 )  PTT:32.5 sec            TSH 0.61   TSH with FT4 reflex --  Total T3 --              POCT Blood Glucose.: 118 mg/dL (04 Nov 2023 21:15)  POCT Blood Glucose.: 128 mg/dL (04 Nov 2023 16:24)  POCT Blood Glucose.: 105 mg/dL (04 Nov 2023 12:09)  POCT Blood Glucose.: 91 mg/dL (04 Nov 2023 08:13)      Urinalysis Basic - ( 04 Nov 2023 06:46 )    Color: x / Appearance: x / SG: x / pH: x  Gluc: 94 mg/dL / Ketone: x  / Bili: x / Urobili: x   Blood: x / Protein: x / Nitrite: x   Leuk Esterase: x / RBC: x / WBC x   Sq Epi: x / Non Sq Epi: x / Bacteria: x        Culture - Blood (collected 03 Nov 2023 23:25)  Source: .Blood Blood-Peripheral  Preliminary Report (05 Nov 2023 08:01):    No growth at 24 hours        RADIOLOGY & ADDITIONAL TESTS:

## 2023-11-06 LAB
GLUCOSE BLDC GLUCOMTR-MCNC: 111 MG/DL — HIGH (ref 70–99)
GLUCOSE BLDC GLUCOMTR-MCNC: 111 MG/DL — HIGH (ref 70–99)
GLUCOSE BLDC GLUCOMTR-MCNC: 121 MG/DL — HIGH (ref 70–99)
GLUCOSE BLDC GLUCOMTR-MCNC: 121 MG/DL — HIGH (ref 70–99)
GLUCOSE BLDC GLUCOMTR-MCNC: 127 MG/DL — HIGH (ref 70–99)
GLUCOSE BLDC GLUCOMTR-MCNC: 127 MG/DL — HIGH (ref 70–99)
GLUCOSE BLDC GLUCOMTR-MCNC: 147 MG/DL — HIGH (ref 70–99)
GLUCOSE BLDC GLUCOMTR-MCNC: 147 MG/DL — HIGH (ref 70–99)

## 2023-11-06 PROCEDURE — 99233 SBSQ HOSP IP/OBS HIGH 50: CPT

## 2023-11-06 RX ORDER — NYSTATIN 500MM UNIT
500000 POWDER (EA) MISCELLANEOUS THREE TIMES A DAY
Refills: 0 | Status: DISCONTINUED | OUTPATIENT
Start: 2023-11-06 | End: 2023-11-07

## 2023-11-06 RX ORDER — LANOLIN ALCOHOL/MO/W.PET/CERES
5 CREAM (GRAM) TOPICAL AT BEDTIME
Refills: 0 | Status: DISCONTINUED | OUTPATIENT
Start: 2023-11-06 | End: 2023-11-07

## 2023-11-06 RX ADMIN — LORATADINE 10 MILLIGRAM(S): 10 TABLET ORAL at 12:25

## 2023-11-06 RX ADMIN — Medication 500000 UNIT(S): at 12:24

## 2023-11-06 RX ADMIN — SODIUM CHLORIDE 3 MILLILITER(S): 9 INJECTION INTRAMUSCULAR; INTRAVENOUS; SUBCUTANEOUS at 21:23

## 2023-11-06 RX ADMIN — Medication 3 MILLILITER(S): at 03:36

## 2023-11-06 RX ADMIN — SODIUM CHLORIDE 3 MILLILITER(S): 9 INJECTION INTRAMUSCULAR; INTRAVENOUS; SUBCUTANEOUS at 06:01

## 2023-11-06 RX ADMIN — Medication 5 MILLIGRAM(S): at 21:04

## 2023-11-06 RX ADMIN — CEFTRIAXONE 1000 MILLIGRAM(S): 500 INJECTION, POWDER, FOR SOLUTION INTRAMUSCULAR; INTRAVENOUS at 21:05

## 2023-11-06 RX ADMIN — AZITHROMYCIN 255 MILLIGRAM(S): 500 TABLET, FILM COATED ORAL at 21:04

## 2023-11-06 RX ADMIN — Medication 800 INTERNATIONAL UNIT(S): at 14:31

## 2023-11-06 RX ADMIN — Medication 400 MILLIGRAM(S): at 14:31

## 2023-11-06 RX ADMIN — Medication 500000 UNIT(S): at 21:04

## 2023-11-06 RX ADMIN — SODIUM CHLORIDE 3 MILLILITER(S): 9 INJECTION INTRAMUSCULAR; INTRAVENOUS; SUBCUTANEOUS at 12:17

## 2023-11-06 RX ADMIN — Medication 112 MICROGRAM(S): at 06:02

## 2023-11-06 RX ADMIN — BENZOCAINE AND MENTHOL 1 LOZENGE: 5; 1 LIQUID ORAL at 12:24

## 2023-11-06 RX ADMIN — Medication 3 MILLILITER(S): at 08:57

## 2023-11-06 RX ADMIN — BENZOCAINE AND MENTHOL 1 LOZENGE: 5; 1 LIQUID ORAL at 17:48

## 2023-11-06 RX ADMIN — Medication 3 MILLILITER(S): at 20:14

## 2023-11-06 RX ADMIN — BENZOCAINE AND MENTHOL 1 LOZENGE: 5; 1 LIQUID ORAL at 06:02

## 2023-11-06 RX ADMIN — Medication 3 MILLILITER(S): at 15:39

## 2023-11-06 RX ADMIN — ENOXAPARIN SODIUM 40 MILLIGRAM(S): 100 INJECTION SUBCUTANEOUS at 17:47

## 2023-11-06 NOTE — PROGRESS NOTE ADULT - SUBJECTIVE AND OBJECTIVE BOX
Patient is a 68y old  Female who presents with a chief complaint of PNA (05 Nov 2023 08:01)      Patient seen and examined at bedside, c/o rib cage and lower back pain   insomnia overnight   + dyspnea on exertion , currently on 2 liter oxygen via NC     cough is better   no  ALLERGIES:  No Known Allergies    MEDICATIONS  (STANDING):  albuterol/ipratropium for Nebulization 3 milliLiter(s) Nebulizer every 6 hours  azithromycin  IVPB 500 milliGRAM(s) IV Intermittent every 24 hours  benzocaine/menthol Lozenge 1 Lozenge Oral four times a day  cefTRIAXone Injectable. 1000 milliGRAM(s) IV Push every 24 hours  dextrose 5%. 1000 milliLiter(s) (50 mL/Hr) IV Continuous <Continuous>  dextrose 50% Injectable 25 Gram(s) IV Push once  enoxaparin Injectable 40 milliGRAM(s) SubCutaneous every 24 hours  glucagon  Injectable 1 milliGRAM(s) IntraMuscular once  insulin lispro (ADMELOG) corrective regimen sliding scale   SubCutaneous Before meals and at bedtime  levothyroxine 112 MICROGram(s) Oral daily  loratadine 10 milliGRAM(s) Oral daily  nystatin    Suspension [id number] Unit(s) Swish and Swallow three times a day  pentoxifylline 400 milliGRAM(s) Oral daily  sodium chloride 0.9% lock flush 3 milliLiter(s) IV Push every 8 hours  vitamin E 800 International Unit(s) Oral daily    MEDICATIONS  (PRN):  acetaminophen     Tablet .. 650 milliGRAM(s) Oral every 6 hours PRN Temp greater or equal to 38C (100.4F), Mild Pain (1 - 3)  aluminum hydroxide/magnesium hydroxide/simethicone Suspension 30 milliLiter(s) Oral every 4 hours PRN Dyspepsia  benzonatate 100 milliGRAM(s) Oral three times a day PRN Cough  dextrose Oral Gel 15 Gram(s) Oral once PRN Blood Glucose LESS THAN 70 milliGRAM(s)/deciliter  melatonin 3 milliGRAM(s) Oral at bedtime PRN Insomnia  morphine  - Injectable 2 milliGRAM(s) IV Push every 4 hours PRN Moderate Pain (4 - 6)  ondansetron Injectable 4 milliGRAM(s) IV Push every 8 hours PRN Nausea and/or Vomiting    Vital Signs Last 24 Hrs  T(F): 98 (06 Nov 2023 07:35), Max: 98.6 (06 Nov 2023 00:45)  HR: 86 (06 Nov 2023 08:59) (86 - 100)  BP: 103/66 (06 Nov 2023 07:35) (103/66 - 154/74)  RR: 18 (06 Nov 2023 07:35) (18 - 18)  SpO2: 96% (06 Nov 2023 07:35) (96% - 98%)  I&O's Summary      PHYSICAL EXAM:  General: awake sitting in the bed   ENT: dry oral mucosa   Neck: supple   Lungs: CTA   Cardio: RRR, S1/S2, No murmur  Abdomen: Soft, Nontender, Nondistended; Bowel sounds present  Extremities: No calf tenderness, No pitting edema    LABS:                        10.1   8.87  )-----------( 155      ( 05 Nov 2023 07:10 )             30.6     11-05    133  |  96  |  9.8  ----------------------------<  94  4.2   |  26.0  |  0.45    Ca    8.3      05 Nov 2023 07:10    TPro  7.0  /  Alb  2.9  /  TBili  0.4  /  DBili  x   /  AST  65  /  ALT  73  /  AlkPhos  421  11-05          PT/INR - ( 03 Nov 2023 16:39 )   PT: 11.0 sec;   INR: 0.99 ratio         PTT - ( 03 Nov 2023 16:39 )  PTT:32.5 sec            TSH 0.61   TSH with FT4 reflex --  Total T3 --              POCT Blood Glucose.: 111 mg/dL (06 Nov 2023 08:26)  POCT Blood Glucose.: 116 mg/dL (05 Nov 2023 21:06)  POCT Blood Glucose.: 107 mg/dL (05 Nov 2023 16:58)  POCT Blood Glucose.: 115 mg/dL (05 Nov 2023 12:02)      Urinalysis Basic - ( 05 Nov 2023 07:10 )    Color: x / Appearance: x / SG: x / pH: x  Gluc: 94 mg/dL / Ketone: x  / Bili: x / Urobili: x   Blood: x / Protein: x / Nitrite: x   Leuk Esterase: x / RBC: x / WBC x   Sq Epi: x / Non Sq Epi: x / Bacteria: x        Culture - Blood (collected 03 Nov 2023 23:30)  Source: .Blood Blood-Venous  Preliminary Report (06 Nov 2023 08:01):    No growth at 48 Hours    Culture - Blood (collected 03 Nov 2023 23:25)  Source: .Blood Blood-Peripheral  Preliminary Report (06 Nov 2023 08:01):    No growth at 48 Hours        RADIOLOGY & ADDITIONAL TESTS:

## 2023-11-06 NOTE — PROGRESS NOTE ADULT - ASSESSMENT
67 y/o female with CAP, Hx of Lymphoma, HLD, Hypothyroidism, Remote hx of Breast/lung cancer, NHL      1. Multifocal pneumonia  - CT with RLL mass vs consolidation- Repeat short term CT chest needed   cont zosyn   wean off oxygen if tolerate   - CTA negative for PE    2. Transaminits  - Noted on 09/2023 with elevated LFTS  - CT abdomen negative: RUQ USG with gallstones  - Hold zocor  improving   cont to monitor     3- Sorethroat   pain when swallows   recent cancer therpy , immunosupressed   will start empirical nystatin swish swallow     4. Lymphoma with lung involmet per pt   [place] [facility] [place]     5 Hypothyroidism:  -Cont. Synthroid.    insomnia add melatonin qhs      69 y/o female with CAP, Hx of Lymphoma, HLD, Hypothyroidism, Remote hx of Breast/lung cancer, NHL      1. Multifocal pneumonia  - CT with RLL mass vs consolidation- Repeat short term CT chest needed   cont iv antibiotics . trend wbc   wean off oxygen if tolerate   - CTA negative for PE    2. Transaminits  - Noted on 09/2023 with elevated LFTS  - CT abdomen negative: RUQ USG with gallstones  - Hold zocor  improving   cont to monitor     3- Sorethroat   pain when swallows   recent cancer therpy , immunosupressed   will start empirical nystatin swish swallow     4. Lymphoma with lung involmet per pt   being treated at [facility] as scheduled for immunotherapy     5 Hypothyroidism:  -Cont. Synthroid.    insomnia add melatonin qhs

## 2023-11-07 ENCOUNTER — TRANSCRIPTION ENCOUNTER (OUTPATIENT)
Age: 68
End: 2023-11-07

## 2023-11-07 VITALS
DIASTOLIC BLOOD PRESSURE: 81 MMHG | OXYGEN SATURATION: 95 % | HEART RATE: 99 BPM | TEMPERATURE: 99 F | SYSTOLIC BLOOD PRESSURE: 121 MMHG | RESPIRATION RATE: 18 BRPM

## 2023-11-07 LAB
GLUCOSE BLDC GLUCOMTR-MCNC: 102 MG/DL — HIGH (ref 70–99)
GLUCOSE BLDC GLUCOMTR-MCNC: 102 MG/DL — HIGH (ref 70–99)
GLUCOSE BLDC GLUCOMTR-MCNC: 112 MG/DL — HIGH (ref 70–99)
GLUCOSE BLDC GLUCOMTR-MCNC: 112 MG/DL — HIGH (ref 70–99)

## 2023-11-07 PROCEDURE — 87899 AGENT NOS ASSAY W/OPTIC: CPT

## 2023-11-07 PROCEDURE — 99239 HOSP IP/OBS DSCHRG MGMT >30: CPT

## 2023-11-07 PROCEDURE — 84436 ASSAY OF TOTAL THYROXINE: CPT

## 2023-11-07 PROCEDURE — 36415 COLL VENOUS BLD VENIPUNCTURE: CPT

## 2023-11-07 PROCEDURE — 83036 HEMOGLOBIN GLYCOSYLATED A1C: CPT

## 2023-11-07 PROCEDURE — 93005 ELECTROCARDIOGRAM TRACING: CPT

## 2023-11-07 PROCEDURE — 82962 GLUCOSE BLOOD TEST: CPT

## 2023-11-07 PROCEDURE — 80053 COMPREHEN METABOLIC PANEL: CPT

## 2023-11-07 PROCEDURE — 87449 NOS EACH ORGANISM AG IA: CPT

## 2023-11-07 PROCEDURE — 96374 THER/PROPH/DIAG INJ IV PUSH: CPT

## 2023-11-07 PROCEDURE — 85025 COMPLETE CBC W/AUTO DIFF WBC: CPT

## 2023-11-07 PROCEDURE — 87040 BLOOD CULTURE FOR BACTERIA: CPT

## 2023-11-07 PROCEDURE — 80048 BASIC METABOLIC PNL TOTAL CA: CPT

## 2023-11-07 PROCEDURE — 85610 PROTHROMBIN TIME: CPT

## 2023-11-07 PROCEDURE — 99285 EMERGENCY DEPT VISIT HI MDM: CPT

## 2023-11-07 PROCEDURE — 87641 MR-STAPH DNA AMP PROBE: CPT

## 2023-11-07 PROCEDURE — 96375 TX/PRO/DX INJ NEW DRUG ADDON: CPT

## 2023-11-07 PROCEDURE — 85027 COMPLETE CBC AUTOMATED: CPT

## 2023-11-07 PROCEDURE — 94760 N-INVAS EAR/PLS OXIMETRY 1: CPT

## 2023-11-07 PROCEDURE — 94640 AIRWAY INHALATION TREATMENT: CPT

## 2023-11-07 PROCEDURE — 87637 SARSCOV2&INF A&B&RSV AMP PRB: CPT

## 2023-11-07 PROCEDURE — 87640 STAPH A DNA AMP PROBE: CPT

## 2023-11-07 PROCEDURE — 76700 US EXAM ABDOM COMPLETE: CPT

## 2023-11-07 PROCEDURE — 85730 THROMBOPLASTIN TIME PARTIAL: CPT

## 2023-11-07 PROCEDURE — 86803 HEPATITIS C AB TEST: CPT

## 2023-11-07 PROCEDURE — 84443 ASSAY THYROID STIM HORMONE: CPT

## 2023-11-07 PROCEDURE — 71275 CT ANGIOGRAPHY CHEST: CPT | Mod: MA

## 2023-11-07 PROCEDURE — 83880 ASSAY OF NATRIURETIC PEPTIDE: CPT

## 2023-11-07 PROCEDURE — 84484 ASSAY OF TROPONIN QUANT: CPT

## 2023-11-07 RX ORDER — PENTOXIFYLLINE 400 MG
1 TABLET, EXTENDED RELEASE ORAL
Refills: 0 | DISCHARGE

## 2023-11-07 RX ORDER — LANOLIN ALCOHOL/MO/W.PET/CERES
1 CREAM (GRAM) TOPICAL
Qty: 0 | Refills: 0 | DISCHARGE
Start: 2023-11-07

## 2023-11-07 RX ORDER — LEVOTHYROXINE SODIUM 125 MCG
1 TABLET ORAL
Refills: 0 | DISCHARGE

## 2023-11-07 RX ORDER — LORATADINE 10 MG/1
1 TABLET ORAL
Qty: 0 | Refills: 0 | DISCHARGE
Start: 2023-11-07

## 2023-11-07 RX ORDER — VITAMIN E 100 UNIT
2 CAPSULE ORAL
Qty: 0 | Refills: 0 | DISCHARGE
Start: 2023-11-07

## 2023-11-07 RX ORDER — LEVOTHYROXINE SODIUM 125 MCG
1 TABLET ORAL
Qty: 0 | Refills: 0 | DISCHARGE
Start: 2023-11-07

## 2023-11-07 RX ORDER — ALBUTEROL 90 UG/1
2 AEROSOL, METERED ORAL
Qty: 1 | Refills: 0
Start: 2023-11-07

## 2023-11-07 RX ORDER — LEVALBUTEROL 1.25 MG/.5ML
3 SOLUTION, CONCENTRATE RESPIRATORY (INHALATION)
Qty: 20 | Refills: 0
Start: 2023-11-07

## 2023-11-07 RX ADMIN — SODIUM CHLORIDE 3 MILLILITER(S): 9 INJECTION INTRAMUSCULAR; INTRAVENOUS; SUBCUTANEOUS at 05:08

## 2023-11-07 RX ADMIN — Medication 3 MILLILITER(S): at 14:56

## 2023-11-07 RX ADMIN — Medication 500000 UNIT(S): at 05:05

## 2023-11-07 RX ADMIN — BENZOCAINE AND MENTHOL 1 LOZENGE: 5; 1 LIQUID ORAL at 12:14

## 2023-11-07 RX ADMIN — Medication 500000 UNIT(S): at 14:25

## 2023-11-07 RX ADMIN — SODIUM CHLORIDE 3 MILLILITER(S): 9 INJECTION INTRAMUSCULAR; INTRAVENOUS; SUBCUTANEOUS at 14:23

## 2023-11-07 RX ADMIN — BENZOCAINE AND MENTHOL 1 LOZENGE: 5; 1 LIQUID ORAL at 05:06

## 2023-11-07 RX ADMIN — Medication 112 MICROGRAM(S): at 05:06

## 2023-11-07 RX ADMIN — LORATADINE 10 MILLIGRAM(S): 10 TABLET ORAL at 14:25

## 2023-11-07 RX ADMIN — Medication 3 MILLILITER(S): at 03:34

## 2023-11-07 RX ADMIN — Medication 3 MILLILITER(S): at 08:48

## 2023-11-07 RX ADMIN — Medication 800 INTERNATIONAL UNIT(S): at 14:38

## 2023-11-07 RX ADMIN — Medication 400 MILLIGRAM(S): at 12:15

## 2023-11-07 NOTE — DISCHARGE NOTE PROVIDER - NSDCCPCAREPLAN_GEN_ALL_CORE_FT
PRINCIPAL DISCHARGE DIAGNOSIS  Diagnosis: Acute respiratory failure with hypoxia  Assessment and Plan of Treatment: improved      SECONDARY DISCHARGE DIAGNOSES  Diagnosis: Pneumonia  Assessment and Plan of Treatment: complete course of therapy   follow up with your oncologist   use nebulizer as needed    Diagnosis: Lymphoma  Assessment and Plan of Treatment: follow up at [facility] [place]

## 2023-11-07 NOTE — PHARMACOTHERAPY INTERVENTION NOTE - COMMENTS
Recommended to discontinue azithromycin in the setting of negative Legionella antigen in a patient empirically being treated for PNA. Patient is on ceftriaxone. WBC is 8.87 mg/dL, and O2 98% on RA.     Nakia Christie, PharmD   Clinical Pharmacy Specialist, Infectious Diseases  Tele-Antimicrobial Stewardship Program (Tele-ASP)  Tele-ASP Phone: [phone]

## 2023-11-07 NOTE — DISCHARGE NOTE PROVIDER - NSDCPNSUBOBJ_GEN_ALL_CORE
Pt is seen in am 11/7   she is still c/o SOB slightly better   no cough   feeling better   tele reviewed   Vital Signs Last 24 Hrs  T(C): 36.7 (07 Nov 2023 07:25), Max: 36.8 (06 Nov 2023 20:30)  T(F): 98 (07 Nov 2023 07:25), Max: 98.2 (06 Nov 2023 20:30)  HR: 96 (07 Nov 2023 08:49) (90 - 101)  BP: 139/78 (07 Nov 2023 07:25) (110/69 - 148/83)  BP(mean): --  RR: 18 (07 Nov 2023 07:25) (18 - 18)  SpO2: 97% (07 Nov 2023 07:25) (92% - 98%)    Parameters below as of 07 Nov 2023 08:49  Patient On (Oxygen Delivery Method): nasal cannula, 1l

## 2023-11-07 NOTE — DISCHARGE NOTE PROVIDER - HOSPITAL COURSE
Patient presents to the ED for 3 weeks of dyspnea on exertion with tachycardia to 130s with mild exertion.  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  On admission pt  c/o chest tightness and cough. Denies any sick contacts, N/V, hemoptysis, In ED vitals with mild tachycardia otherwise stable, RVP neg. CTA without PE, but does show evidence of multifocal PNA. Mild hypoxia noted on RA and leucocytosis. She was given IVF, cultured, and given broad spectrum IV abx. Pt is traeted with ov abx , oxygen , cont to compali SOB more on exertioon , wean off oxygen over hosp course   checked the pulse ox on RA then ambulation prior Dc      Patient presents to the ED for 3 weeks of dyspnea on exertion with tachycardia to 130s with mild exertion.  Currently with b cell [place] due [place] [facility] [place]   PMH of NIDDM2, HTN, hypothyroid, non Hodkins lymphoma with resection of lung and breast.  On admission pt  c/o chest tightness and cough. Denies any sick contacts, N/V, hemoptysis, In ED vitals with mild tachycardia otherwise stable, RVP neg. CTA without PE, but does show evidence of multifocal PNA. Mild hypoxia noted on RA and leucocytosis. She was given IVF, cultured, and given broad spectrum IV abx., and oxygen. Hypoxia resolved. The patient no longer requires supplemental oxygen. Zocor held due to elevated LFT's. F/I with PMD for repeat Hepatic function testing prior to resumption. [place]  [facility] [place]. The patients is medically stable for discharge.

## 2023-11-07 NOTE — DISCHARGE NOTE PROVIDER - NSDCMRMEDTOKEN_GEN_ALL_CORE_FT
Crestor 5 mg oral tablet: 1 tab(s) orally once a day  Synthroid 112 mcg (0.112 mg) oral tablet: 1 tab(s) orally once a day  TRENtal 400 mg oral tablet, extended release: 1 tab(s) orally once a day   Augmentin 500 mg-125 mg oral tablet: 500 milligram(s) orally 2 times a day start tomorrow am  Crestor 5 mg oral tablet: 1 tab(s) orally once a day  levothyroxine 112 mcg (0.112 mg) oral tablet: 1 tab(s) orally once a day  loratadine 10 mg oral tablet: 1 tab(s) orally once a day  melatonin 5 mg oral tablet: 1 tab(s) orally once a day (at bedtime)  vitamin E 400 intl units oral capsule: 2 cap(s) orally once a day  Xopenex 0.63 mg/3 mL inhalation solution: 3 milliliter(s) by nebulizer 4 times a day as needed for  shortness of breath

## 2023-11-07 NOTE — DISCHARGE NOTE NURSING/CASE MANAGEMENT/SOCIAL WORK - NSDCFUADDAPPT_GEN_ALL_CORE_FT
[facility] RUTH ARIAS [id number] have no PCP or Pulmonologist in the community. Pt use her Oncologist Dr uAstin Johnson in [facility]. Pt declined F/U appts, and Vivo meds to bed. D/C plan home with resumption service with [facility].    PT AGREEABLE TO RESUME HOME CARE SERVICES WITH [facility]  PT DECLINED FOLLOW UP APPTS.  PT DECLINED VIVO MEDS TO BED, WANTS MEDS SED TO [facility] IN [place]  YELLOW FOLDER GIVEN WITH INSTRUCTIONS.
No carotid bruit noted/supple/no JVD

## 2023-11-07 NOTE — DISCHARGE NOTE NURSING/CASE MANAGEMENT/SOCIAL WORK - PATIENT PORTAL LINK FT
You can access the FollowMyHealth Patient Portal offered by [facility] by registering at the following website: [email or web address]. By joining [employer]s FollowMyHealth portal, you will also be able to view your health information using other applications (apps) compatible with our system.

## 2023-11-09 LAB
CULTURE RESULTS: SIGNIFICANT CHANGE UP
SPECIMEN SOURCE: SIGNIFICANT CHANGE UP

## 2023-11-13 ENCOUNTER — TRANSCRIPTION ENCOUNTER (OUTPATIENT)
Age: 68
End: 2023-11-13

## 2023-11-14 ENCOUNTER — TRANSCRIPTION ENCOUNTER (OUTPATIENT)
Age: 68
End: 2023-11-14

## 2023-11-14 PROBLEM — C85.90 NON-HODGKIN LYMPHOMA, UNSPECIFIED, UNSPECIFIED SITE: Chronic | Status: ACTIVE | Noted: 2023-11-04

## 2023-11-14 PROBLEM — C34.90 MALIGNANT NEOPLASM OF UNSPECIFIED PART OF UNSPECIFIED BRONCHUS OR LUNG: Chronic | Status: ACTIVE | Noted: 2023-11-04

## 2023-11-14 PROBLEM — E78.5 HYPERLIPIDEMIA, UNSPECIFIED: Chronic | Status: ACTIVE | Noted: 2023-11-04

## 2023-11-14 PROBLEM — C50.919 MALIGNANT NEOPLASM OF UNSPECIFIED SITE OF UNSPECIFIED FEMALE BREAST: Chronic | Status: ACTIVE | Noted: 2023-11-04

## 2023-11-14 PROBLEM — Z86.79 PERSONAL HISTORY OF OTHER DISEASES OF THE CIRCULATORY SYSTEM: Chronic | Status: ACTIVE | Noted: 2023-11-04

## 2023-11-15 ENCOUNTER — APPOINTMENT (OUTPATIENT)
Dept: CARE COORDINATION | Facility: HOME HEALTH | Age: 68
End: 2023-11-15
Payer: MEDICARE

## 2023-11-15 ENCOUNTER — TRANSCRIPTION ENCOUNTER (OUTPATIENT)
Age: 68
End: 2023-11-15

## 2023-11-15 DIAGNOSIS — I10 ESSENTIAL (PRIMARY) HYPERTENSION: ICD-10-CM

## 2023-11-15 DIAGNOSIS — E03.9 HYPOTHYROIDISM, UNSPECIFIED: ICD-10-CM

## 2023-11-15 PROCEDURE — 99495 TRANSJ CARE MGMT MOD F2F 14D: CPT

## 2023-11-20 VITALS
DIASTOLIC BLOOD PRESSURE: 90 MMHG | HEART RATE: 103 BPM | RESPIRATION RATE: 15 BRPM | SYSTOLIC BLOOD PRESSURE: 140 MMHG | OXYGEN SATURATION: 96 %

## 2023-11-20 PROBLEM — E03.9 HYPOTHYROIDISM: Status: ACTIVE | Noted: 2023-11-20

## 2023-11-20 PROBLEM — I10 HTN (HYPERTENSION): Status: ACTIVE | Noted: 2023-11-20

## 2023-11-20 RX ORDER — VITAMIN E (DL,TOCOPHERYL ACET) 450 MG
450 CAPSULE ORAL
Refills: 0 | Status: ACTIVE | COMMUNITY

## 2023-11-20 RX ORDER — LEVOTHYROXINE SODIUM 112 UG/1
112 TABLET ORAL DAILY
Refills: 0 | Status: ACTIVE | COMMUNITY

## 2023-11-22 ENCOUNTER — TRANSCRIPTION ENCOUNTER (OUTPATIENT)
Age: 68
End: 2023-11-22

## 2023-11-29 ENCOUNTER — TRANSCRIPTION ENCOUNTER (OUTPATIENT)
Age: 68
End: 2023-11-29

## 2023-12-03 ENCOUNTER — TRANSCRIPTION ENCOUNTER (OUTPATIENT)
Age: 68
End: 2023-12-03

## 2023-12-08 ENCOUNTER — TRANSCRIPTION ENCOUNTER (OUTPATIENT)
Age: 68
End: 2023-12-08

## 2023-12-18 ENCOUNTER — APPOINTMENT (OUTPATIENT)
Dept: PULMONOLOGY | Facility: CLINIC | Age: 68
End: 2023-12-18
Payer: MEDICARE

## 2023-12-18 VITALS — HEART RATE: 104 BPM | OXYGEN SATURATION: 96 %

## 2023-12-18 VITALS
WEIGHT: 129 LBS | BODY MASS INDEX: 24.35 KG/M2 | HEIGHT: 61 IN | SYSTOLIC BLOOD PRESSURE: 132 MMHG | RESPIRATION RATE: 16 BRPM | DIASTOLIC BLOOD PRESSURE: 84 MMHG

## 2023-12-18 DIAGNOSIS — Z09 ENCOUNTER FOR FOLLOW-UP EXAMINATION AFTER COMPLETED TREATMENT FOR CONDITIONS OTHER THAN MALIGNANT NEOPLASM: ICD-10-CM

## 2023-12-18 PROCEDURE — 99205 OFFICE O/P NEW HI 60 MIN: CPT

## 2023-12-18 RX ORDER — LEVALBUTEROL HYDROCHLORIDE 0.31 MG/3ML
0.31 SOLUTION RESPIRATORY (INHALATION) 3 TIMES DAILY
Refills: 0 | Status: DISCONTINUED | COMMUNITY
End: 2023-12-18

## 2023-12-18 RX ORDER — GLUCOSAMINE/MSM/CHONDROIT SULF 500-166.6
10 TABLET ORAL
Refills: 0 | Status: DISCONTINUED | COMMUNITY
End: 2023-12-18

## 2023-12-18 RX ORDER — AMOXICILLIN AND CLAVULANATE POTASSIUM 500; 125 MG/1; 1/1
500-125 TABLET, FILM COATED ORAL
Refills: 0 | Status: DISCONTINUED | COMMUNITY
End: 2023-12-18

## 2023-12-18 RX ORDER — LORATADINE 10 MG/1
10 TABLET ORAL
Refills: 0 | Status: DISCONTINUED | COMMUNITY
End: 2023-12-18

## 2023-12-18 NOTE — RESULTS/DATA
[TextEntry] :  ACC: [id number] EXAM: CT ANGIO CHEST PULM [facility] ORDERED BY: KWAME DAN PROCEDURE DATE: 11/03/2023 INTERPRETATION: CLINICAL INFORMATION: Dyspnea, tachycardia. B-cell lymphoma COMPARISON: CT chest 9/30/2023. CONTRAST/COMPLICATIONS: IV Contrast: Omnipaque 350 70 cc administered 30 cc discarded Oral Contrast: NONE Complications: None reported at time of study completion PROCEDURE: CT Angiography of the Chest. Sagittal and coronal reformats were performed as well as 3D (MIP) reconstructions. FINDINGS: LUNGS AND AIRWAYS: Patent central airways. Biapical scarring. Status post left lower lobe surgery with stable postsurgical changes. Patchy primarily peripheral airspace opacities, new from the previous exam. This is most prominent in the right lower lobe where there is a masslike consolidation measuring approximately 3 cm. This could represent atypical pneumonia, given the history of B-cell lymphoma malignancy is not excluded. Correlate clinically. Recommend short-term follow-up noncontrast chest CT to assess for resolution. PLEURA: No pleural effusion. MEDIASTINUM AND BRITTON: No lymphadenopathy. VESSELS: Pulmonary embolism. HEART: Heart size is normal. No pericardial effusion. CHEST WALL AND LOWER NECK: Within normal limits. VISUALIZED UPPER ABDOMEN: Spleen is mildly enlarged. BONES: Degenerative changes. IMPRESSION:  No pulmonary embolism.  Patchy primarily peripheral airspace opacities, new from the previous exam. This is most prominent in the right lower lobe where there is a masslike consolidation measuring approximately 3 cm. This could represent atypical pneumonia, given the history of B-cell lymphoma malignancy is not excluded. Correlate clinically. Recommend short-term follow-up noncontrast chest CT to assess for resolution.   --- End of Report ---      CYNTHIA MORLEY MD; Attending Radiologist This document has been electronically signed. Nov 3 2023 10:19PM

## 2023-12-18 NOTE — HISTORY OF PRESENT ILLNESS
[TextBox_4] : Hospital Course Discharge Date 07-Nov-2023 Admission Date 03-Nov-2023 23:03 Reason for Admission PNA Hospital Course  Patient presents to the ED for 3 weeks of dyspnea on exertion with tachycardia to 130s with mild exertion.  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 Denies any sick contacts, N/V, hemoptysis, In ED vitals with mild tachycardia otherwise stable, RVP neg. CTA without PE, but does show evidence of multifocal PNA. Mild hypoxia noted on RA and leucocytosis. She was given IVF, cultured, and given broad spectrum IV abx., and oxygen. Hypoxia resolved. The patient no longer requires supplemental oxygen. Zocor held due to elevated LFT's. F/I with PMD for repeat Hepatic function testing prior to resumption. Patient instructed to f/u with [facility] as scheduled for immunotherapy. The patients is medically stable for discharge.   Patient has received treatment at [facility] for the above with antivirals and monoclonal antibody with an excellent response.  Recent PET/CT showed significant resolution of her previously seen adenopathy.  This report is not able to be reviewed.  She denies any complaints of cough wheeze or sputum production.  No fevers chills lightheadedness or dizziness.  Patient is a non-smoker with no history of exposures.

## 2023-12-18 NOTE — DISCUSSION/SUMMARY
[FreeTextEntry1] : 68-year-old female with a history of hypertension non-insulin-dependent diabetes hypothyroidism non-Hodgkin's lymphoma treated with mantle radiation 19 84, non-small cell lung cancer status postresection 2015 and breast cancer 2020 status postlumpectomy treated in October for EBV/LPD and admitted to [facility] on 11/3 for multilobar pneumonia.  Patient doing well with resolution of hypoxemia and resolution of symptoms.  Plan: 1.  Follow-up CAT scan in 3 months from the date of her original CT 2.  Follow-up here in 2 months

## 2023-12-18 NOTE — CONSULT LETTER
[Dear  ___] : Dear  [unfilled], [Consult Letter:] : I had the pleasure of evaluating your patient, [unfilled]. [Please see my note below.] : Please see my note below. [Consult Closing:] : Thank you very much for allowing me to participate in the care of this patient.  If you have any questions, please do not hesitate to contact me. [Sincerely,] : Sincerely, [FreeTextEntry3] : Silver Lawson MD FCCP Pulmonary/Critical Care/Sleep Medicine Department of Internal Medicine  [facility]

## 2023-12-18 NOTE — END OF VISIT
[Time Spent: ___ minutes] : I have spent [unfilled] minutes of time on the encounter. [FreeTextEntry3] : Hospital review, PACS review, chart review

## 2024-01-08 ENCOUNTER — OFFICE (OUTPATIENT)
Dept: URBAN - METROPOLITAN AREA CLINIC 113 | Facility: CLINIC | Age: 69
Setting detail: OPHTHALMOLOGY
End: 2024-01-08
Payer: MEDICARE

## 2024-01-08 VITALS — HEIGHT: 55 IN

## 2024-01-08 DIAGNOSIS — H26.493: ICD-10-CM

## 2024-01-08 DIAGNOSIS — H43.813: ICD-10-CM

## 2024-01-08 DIAGNOSIS — H35.373: ICD-10-CM

## 2024-01-08 DIAGNOSIS — E11.9: ICD-10-CM

## 2024-01-08 DIAGNOSIS — Z96.1: ICD-10-CM

## 2024-01-08 PROCEDURE — 92014 COMPRE OPH EXAM EST PT 1/>: CPT | Performed by: OPTOMETRIST

## 2024-01-08 PROCEDURE — 92250 FUNDUS PHOTOGRAPHY W/I&R: CPT | Performed by: OPTOMETRIST

## 2024-01-08 ASSESSMENT — REFRACTION_AUTOREFRACTION
OS_AXIS: 115
OD_AXIS: 045
OD_CYLINDER: -0.25
OS_SPHERE: -0.50
OS_CYLINDER: -0.50
OD_SPHERE: -0.50

## 2024-01-08 ASSESSMENT — SPHEQUIV_DERIVED
OD_SPHEQUIV: -0.625
OS_SPHEQUIV: -0.75
OD_SPHEQUIV: -0.75

## 2024-01-08 ASSESSMENT — REFRACTION_MANIFEST
OD_AXIS: 025
OS_AXIS: 120
OS_SPHERE: PLANO
OD_SPHERE: -0.25
OS_CYLINDER: -0.50
OS_VA1: 20/20
OD_VA1: 20/20
OD_CYLINDER: -1.00

## 2024-01-08 ASSESSMENT — CONFRONTATIONAL VISUAL FIELD TEST (CVF)
OS_FINDINGS: FULL
OD_FINDINGS: FULL

## 2024-01-08 ASSESSMENT — LID POSITION - PTOSIS: OS_PTOSIS: LUL 1+

## 2024-02-14 ENCOUNTER — APPOINTMENT (OUTPATIENT)
Dept: CT IMAGING | Facility: CLINIC | Age: 69
End: 2024-02-14
Payer: MEDICARE

## 2024-02-14 ENCOUNTER — OUTPATIENT (OUTPATIENT)
Dept: OUTPATIENT SERVICES | Facility: HOSPITAL | Age: 69
LOS: 1 days | End: 2024-02-14
Payer: MEDICARE

## 2024-02-14 DIAGNOSIS — Z98.890 OTHER SPECIFIED POSTPROCEDURAL STATES: Chronic | ICD-10-CM

## 2024-02-14 DIAGNOSIS — J15.9 UNSPECIFIED BACTERIAL PNEUMONIA: ICD-10-CM

## 2024-02-14 PROCEDURE — 71250 CT THORAX DX C-: CPT

## 2024-02-14 PROCEDURE — 71250 CT THORAX DX C-: CPT | Mod: 26,MH

## 2024-02-15 ENCOUNTER — TRANSCRIPTION ENCOUNTER (OUTPATIENT)
Age: 69
End: 2024-02-15

## 2024-02-20 ENCOUNTER — APPOINTMENT (OUTPATIENT)
Dept: PULMONOLOGY | Facility: CLINIC | Age: 69
End: 2024-02-20
Payer: MEDICARE

## 2024-02-20 VITALS
SYSTOLIC BLOOD PRESSURE: 137 MMHG | WEIGHT: 129 LBS | HEIGHT: 61 IN | RESPIRATION RATE: 16 BRPM | BODY MASS INDEX: 24.35 KG/M2 | HEART RATE: 89 BPM | OXYGEN SATURATION: 97 % | DIASTOLIC BLOOD PRESSURE: 82 MMHG

## 2024-02-20 DIAGNOSIS — J15.9 UNSPECIFIED BACTERIAL PNEUMONIA: ICD-10-CM

## 2024-02-20 DIAGNOSIS — C85.10 UNSPECIFIED B-CELL LYMPHOMA, UNSPECIFIED SITE: ICD-10-CM

## 2024-02-20 PROCEDURE — G2211 COMPLEX E/M VISIT ADD ON: CPT

## 2024-02-20 PROCEDURE — 99215 OFFICE O/P EST HI 40 MIN: CPT

## 2024-02-20 NOTE — RESULTS/DATA
[TextEntry] : Chest CT performed on 2/14/2024 at [facility] was compared against previous films from November 2023 at that time the patient was suffering from a pneumonia.  This demonstrates some residual fibrotic and cystic changes within the apices of the lungs bilaterally left greater than right.  Previously seen bilateral groundglass type infiltrates have now resolved.  Calcifications of the aorta and aortic arch are noted.  Persistent fibrotic changes seen along the major fissure on the left.  No acute findings are noted.  (Official report pending, films reviewed on PACS with patient and compared against previous films)

## 2024-02-20 NOTE — CONSULT LETTER
[Dear  ___] : Dear  [unfilled], [Consult Letter:] : I had the pleasure of evaluating your patient, [unfilled]. [Please see my note below.] : Please see my note below. [Consult Closing:] : Thank you very much for allowing me to participate in the care of this patient.  If you have any questions, please do not hesitate to contact me. [Sincerely,] : Sincerely, [FreeTextEntry3] : Silver Lawson MD FCCP Pulmonary/Critical Care/Sleep Medicine Department of Internal Medicine  [facility]

## 2024-02-20 NOTE — HISTORY OF PRESENT ILLNESS
[TextBox_4] : 68-year-old female with a complicated history of hypertension, nondiet insulin-dependent diabetes, hypothyroidism, non-Hodgkin's lymphoma treated with mantle radiation 1984, non-small cell carcinoma status post resection in 2015, breast cancer status postlumpectomy in 2020 and recent treatment in October 2023 for EBV/LPD.  Patient's course was complicated by multilobar pneumonia in November 2023 and is being seen today in follow-up.  Hypoxemia has resolved.  She underwent a repeat CAT scan of the chest which will be reviewed below.  There is no history of subsequent fevers chills lightheadedness or dizziness.  Her exercise tolerance is now once again normal.  She is followed for all of the above at [facility]

## 2024-02-20 NOTE — DISCUSSION/SUMMARY
[FreeTextEntry1] : 68-year-old female seen today for the above.  Patient's bilateral pneumonia has responded well to a course of antibiotics and hospitalization with residual scarring both at the apices and within the left lung most likely due to prior treatment.  Patient will continue to undergo routine surveillance for all 3 of her underlying malignancies by [facility].  She will therefore be followed up in this office only on a as needed basis.

## 2024-02-20 NOTE — END OF VISIT
[FreeTextEntry3] : CTs reviewed on PACS and compared with patient [Time Spent: ___ minutes] : I have spent [unfilled] minutes of time on the encounter.

## 2024-02-26 ENCOUNTER — NON-APPOINTMENT (OUTPATIENT)
Age: 69
End: 2024-02-26

## 2024-04-03 ENCOUNTER — APPOINTMENT (OUTPATIENT)
Dept: PULMONOLOGY | Facility: CLINIC | Age: 69
End: 2024-04-03

## 2024-07-01 NOTE — ED ADULT NURSE NOTE - BIRTH SEX
Spoke with the patient. Provided update from Dr. Galeano. Pt verbalized understanding. We reviewed upcoming appt time and patient had no further questions or concerns at this time.   
Female

## 2024-09-17 ENCOUNTER — NON-APPOINTMENT (OUTPATIENT)
Age: 69
End: 2024-09-17

## 2024-10-14 ENCOUNTER — APPOINTMENT (OUTPATIENT)
Dept: PULMONOLOGY | Facility: CLINIC | Age: 69
End: 2024-10-14
Payer: MEDICARE

## 2024-10-14 VITALS
DIASTOLIC BLOOD PRESSURE: 80 MMHG | WEIGHT: 138 LBS | HEART RATE: 90 BPM | RESPIRATION RATE: 16 BRPM | SYSTOLIC BLOOD PRESSURE: 125 MMHG | OXYGEN SATURATION: 94 % | BODY MASS INDEX: 26.08 KG/M2

## 2024-10-14 DIAGNOSIS — J15.9 UNSPECIFIED BACTERIAL PNEUMONIA: ICD-10-CM

## 2024-10-14 PROCEDURE — G2211 COMPLEX E/M VISIT ADD ON: CPT

## 2024-10-14 PROCEDURE — 99215 OFFICE O/P EST HI 40 MIN: CPT

## 2024-11-24 ENCOUNTER — NON-APPOINTMENT (OUTPATIENT)
Age: 69
End: 2024-11-24

## 2024-12-31 ENCOUNTER — APPOINTMENT (OUTPATIENT)
Dept: PULMONOLOGY | Facility: CLINIC | Age: 69
End: 2024-12-31
Payer: MEDICARE

## 2024-12-31 VITALS — WEIGHT: 135 LBS | HEIGHT: 60.5 IN | BODY MASS INDEX: 25.82 KG/M2

## 2024-12-31 VITALS
OXYGEN SATURATION: 97 % | BODY MASS INDEX: 25.82 KG/M2 | HEIGHT: 60.5 IN | HEART RATE: 89 BPM | DIASTOLIC BLOOD PRESSURE: 76 MMHG | WEIGHT: 135 LBS | RESPIRATION RATE: 16 BRPM | SYSTOLIC BLOOD PRESSURE: 137 MMHG

## 2024-12-31 DIAGNOSIS — R93.89 ABNORMAL FINDINGS ON DIAGNOSTIC IMAGING OF OTHER SPECIFIED BODY STRUCTURES: ICD-10-CM

## 2024-12-31 PROCEDURE — 94729 DIFFUSING CAPACITY: CPT

## 2024-12-31 PROCEDURE — 99214 OFFICE O/P EST MOD 30 MIN: CPT | Mod: 25

## 2024-12-31 PROCEDURE — 85018 HEMOGLOBIN: CPT | Mod: QW

## 2024-12-31 PROCEDURE — 94010 BREATHING CAPACITY TEST: CPT

## 2024-12-31 PROCEDURE — 94727 GAS DIL/WSHOT DETER LNG VOL: CPT

## 2025-01-03 ENCOUNTER — NON-APPOINTMENT (OUTPATIENT)
Age: 70
End: 2025-01-03

## 2025-01-27 ENCOUNTER — OFFICE (OUTPATIENT)
Dept: URBAN - METROPOLITAN AREA CLINIC 113 | Facility: CLINIC | Age: 70
Setting detail: OPHTHALMOLOGY
End: 2025-01-27
Payer: MEDICARE

## 2025-01-27 DIAGNOSIS — H11.32: ICD-10-CM

## 2025-01-27 DIAGNOSIS — H43.813: ICD-10-CM

## 2025-01-27 DIAGNOSIS — H35.373: ICD-10-CM

## 2025-01-27 PROCEDURE — 92014 COMPRE OPH EXAM EST PT 1/>: CPT | Performed by: OPTOMETRIST

## 2025-01-27 PROCEDURE — 92134 CPTRZ OPH DX IMG PST SGM RTA: CPT | Performed by: OPTOMETRIST

## 2025-01-27 ASSESSMENT — CONFRONTATIONAL VISUAL FIELD TEST (CVF)
OS_FINDINGS: FULL
OD_FINDINGS: FULL

## 2025-01-27 ASSESSMENT — REFRACTION_AUTOREFRACTION
OD_SPHERE: -0.25
OD_AXIS: 038
OD_CYLINDER: -0.75
OS_CYLINDER: -0.75
OS_SPHERE: -0.50
OS_AXIS: 108

## 2025-01-27 ASSESSMENT — LID POSITION - PTOSIS: OS_PTOSIS: LUL 1+

## 2025-01-27 ASSESSMENT — REFRACTION_MANIFEST
OD_AXIS: 025
OD_CYLINDER: -1.00
OD_VA1: 20/20
OS_VA1: 20/20
OS_SPHERE: PLANO
OD_SPHERE: -0.25
OS_AXIS: 120
OS_CYLINDER: -0.50

## 2025-01-27 ASSESSMENT — KERATOMETRY
OS_K2POWER_DIOPTERS: 49.00
OS_AXISANGLE_DEGREES: 082
OD_K1POWER_DIOPTERS: 46.25
OD_AXISANGLE_DEGREES: 090
METHOD_AUTO_MANUAL: AUTO
OS_K1POWER_DIOPTERS: 46.25
OD_K2POWER_DIOPTERS: 48.25

## 2025-01-27 ASSESSMENT — VISUAL ACUITY
OD_BCVA: 20/25+1
OS_BCVA: 20/20-1

## 2025-01-27 ASSESSMENT — TONOMETRY
OS_IOP_MMHG: 16
OD_IOP_MMHG: 15

## 2025-02-11 ENCOUNTER — NON-APPOINTMENT (OUTPATIENT)
Age: 70
End: 2025-02-11

## (undated) DEVICE — STERIS DEFENDO 3-PIECE KIT (AIR/WATER, SUCTION & BIOPSY VALVES)